# Patient Record
Sex: FEMALE | Race: WHITE | Employment: FULL TIME | URBAN - METROPOLITAN AREA
[De-identification: names, ages, dates, MRNs, and addresses within clinical notes are randomized per-mention and may not be internally consistent; named-entity substitution may affect disease eponyms.]

---

## 2018-01-10 ENCOUNTER — HOSPITAL ENCOUNTER (OUTPATIENT)
Dept: MAMMOGRAPHY | Age: 43
Discharge: HOME | End: 2018-01-10
Payer: COMMERCIAL

## 2018-01-10 DIAGNOSIS — Z12.31 SCREENING MAMMOGRAM, ENCOUNTER FOR: ICD-10-CM

## 2018-01-10 DIAGNOSIS — Z01.419 WELL WOMAN EXAM: ICD-10-CM

## 2018-01-10 DIAGNOSIS — Z80.3 FAMILY HISTORY OF BREAST CANCER: ICD-10-CM

## 2018-01-10 PROCEDURE — 77067 SCR MAMMO BI INCL CAD: CPT

## 2018-06-12 PROBLEM — Z80.3 FAMILY HISTORY OF BREAST CANCER: Chronic | Status: ACTIVE | Noted: 2018-06-12

## 2019-01-17 ENCOUNTER — HOSPITAL ENCOUNTER (OUTPATIENT)
Dept: MAMMOGRAPHY | Age: 44
Discharge: HOME | End: 2019-01-17
Attending: OBSTETRICS & GYNECOLOGY
Payer: COMMERCIAL

## 2019-01-17 DIAGNOSIS — R92.2 DENSE BREAST TISSUE ON MAMMOGRAM: ICD-10-CM

## 2019-01-17 DIAGNOSIS — Z01.419 WELL WOMAN EXAM: ICD-10-CM

## 2019-01-17 DIAGNOSIS — Z12.31 SCREENING MAMMOGRAM, ENCOUNTER FOR: ICD-10-CM

## 2019-01-17 PROCEDURE — 77063 BREAST TOMOSYNTHESIS BI: CPT

## 2019-01-22 NOTE — PROGRESS NOTES
Could have breast MRI. Will need to likely precert insurance. Lifetime incidence of breast cancer is 20%.

## 2020-01-29 ENCOUNTER — HOSPITAL ENCOUNTER (OUTPATIENT)
Dept: MAMMOGRAPHY | Age: 45
Discharge: HOME | End: 2020-01-29
Attending: NURSE PRACTITIONER
Payer: COMMERCIAL

## 2020-01-29 DIAGNOSIS — Z12.39 BREAST CANCER SCREENING: ICD-10-CM

## 2020-01-29 PROCEDURE — 77063 BREAST TOMOSYNTHESIS BI: CPT

## 2020-01-31 NOTE — PROGRESS NOTES
Mammogram benign. Due to pt's family history and dense breast tissue, can offer pt MRI. Have her check with insurance coverage if she is interested.

## 2020-02-05 ENCOUNTER — HOSPITAL ENCOUNTER (EMERGENCY)
Age: 45
Discharge: HOME | End: 2020-02-05
Attending: EMERGENCY MEDICINE
Payer: COMMERCIAL

## 2020-02-05 ENCOUNTER — APPOINTMENT (OUTPATIENT)
Dept: GENERAL RADIOLOGY | Age: 45
End: 2020-02-05
Attending: EMERGENCY MEDICINE
Payer: COMMERCIAL

## 2020-02-05 VITALS
HEART RATE: 82 BPM | WEIGHT: 170 LBS | RESPIRATION RATE: 16 BRPM | DIASTOLIC BLOOD PRESSURE: 76 MMHG | OXYGEN SATURATION: 98 % | BODY MASS INDEX: 29.02 KG/M2 | TEMPERATURE: 97.4 F | SYSTOLIC BLOOD PRESSURE: 140 MMHG | HEIGHT: 64 IN

## 2020-02-05 DIAGNOSIS — R07.89 ATYPICAL CHEST PAIN: Primary | ICD-10-CM

## 2020-02-05 LAB
ALBUMIN SERPL-MCNC: 3.8 G/DL (ref 3.5–5)
ALBUMIN/GLOB SERPL: 1 {RATIO} (ref 1.2–3.5)
ALP SERPL-CCNC: 90 U/L (ref 50–130)
ALT SERPL-CCNC: 31 U/L (ref 12–65)
ANION GAP SERPL CALC-SCNC: 5 MMOL/L (ref 7–16)
AST SERPL-CCNC: 16 U/L (ref 15–37)
ATRIAL RATE: 86 BPM
BASOPHILS # BLD: 0 K/UL (ref 0–0.2)
BASOPHILS NFR BLD: 1 % (ref 0–2)
BILIRUB SERPL-MCNC: 0.8 MG/DL (ref 0.2–1.1)
BUN SERPL-MCNC: 10 MG/DL (ref 6–23)
CALCIUM SERPL-MCNC: 8.7 MG/DL (ref 8.3–10.4)
CALCULATED P AXIS, ECG09: 70 DEGREES
CALCULATED R AXIS, ECG10: 74 DEGREES
CALCULATED T AXIS, ECG11: 65 DEGREES
CHLORIDE SERPL-SCNC: 104 MMOL/L (ref 98–107)
CO2 SERPL-SCNC: 29 MMOL/L (ref 21–32)
CREAT SERPL-MCNC: 0.91 MG/DL (ref 0.6–1)
DIAGNOSIS, 93000: NORMAL
DIFFERENTIAL METHOD BLD: NORMAL
EOSINOPHIL # BLD: 0.1 K/UL (ref 0–0.8)
EOSINOPHIL NFR BLD: 2 % (ref 0.5–7.8)
ERYTHROCYTE [DISTWIDTH] IN BLOOD BY AUTOMATED COUNT: 13.1 % (ref 11.9–14.6)
GLOBULIN SER CALC-MCNC: 4 G/DL (ref 2.3–3.5)
GLUCOSE SERPL-MCNC: 110 MG/DL (ref 65–100)
HCT VFR BLD AUTO: 40.8 % (ref 35.8–46.3)
HGB BLD-MCNC: 13.7 G/DL (ref 11.7–15.4)
IMM GRANULOCYTES # BLD AUTO: 0 K/UL (ref 0–0.5)
IMM GRANULOCYTES NFR BLD AUTO: 1 % (ref 0–5)
LYMPHOCYTES # BLD: 1.5 K/UL (ref 0.5–4.6)
LYMPHOCYTES NFR BLD: 26 % (ref 13–44)
MAGNESIUM SERPL-MCNC: 2 MG/DL (ref 1.8–2.4)
MCH RBC QN AUTO: 29.5 PG (ref 26.1–32.9)
MCHC RBC AUTO-ENTMCNC: 33.6 G/DL (ref 31.4–35)
MCV RBC AUTO: 87.7 FL (ref 79.6–97.8)
MONOCYTES # BLD: 0.6 K/UL (ref 0.1–1.3)
MONOCYTES NFR BLD: 11 % (ref 4–12)
NEUTS SEG # BLD: 3.5 K/UL (ref 1.7–8.2)
NEUTS SEG NFR BLD: 61 % (ref 43–78)
NRBC # BLD: 0 K/UL (ref 0–0.2)
P-R INTERVAL, ECG05: 124 MS
PLATELET # BLD AUTO: 216 K/UL (ref 150–450)
PMV BLD AUTO: 9.4 FL (ref 9.4–12.3)
POTASSIUM SERPL-SCNC: 3.9 MMOL/L (ref 3.5–5.1)
PROT SERPL-MCNC: 7.8 G/DL (ref 6.3–8.2)
Q-T INTERVAL, ECG07: 358 MS
QRS DURATION, ECG06: 86 MS
QTC CALCULATION (BEZET), ECG08: 428 MS
RBC # BLD AUTO: 4.65 M/UL (ref 4.05–5.2)
SODIUM SERPL-SCNC: 138 MMOL/L (ref 136–145)
TROPONIN I BLD-MCNC: 0 NG/ML (ref 0.02–0.05)
VENTRICULAR RATE, ECG03: 86 BPM
WBC # BLD AUTO: 5.7 K/UL (ref 4.3–11.1)

## 2020-02-05 PROCEDURE — 74011250637 HC RX REV CODE- 250/637: Performed by: EMERGENCY MEDICINE

## 2020-02-05 PROCEDURE — 84484 ASSAY OF TROPONIN QUANT: CPT

## 2020-02-05 PROCEDURE — 83735 ASSAY OF MAGNESIUM: CPT

## 2020-02-05 PROCEDURE — 93005 ELECTROCARDIOGRAM TRACING: CPT | Performed by: EMERGENCY MEDICINE

## 2020-02-05 PROCEDURE — 99285 EMERGENCY DEPT VISIT HI MDM: CPT

## 2020-02-05 PROCEDURE — 74011250636 HC RX REV CODE- 250/636: Performed by: EMERGENCY MEDICINE

## 2020-02-05 PROCEDURE — 96374 THER/PROPH/DIAG INJ IV PUSH: CPT

## 2020-02-05 PROCEDURE — 80053 COMPREHEN METABOLIC PANEL: CPT

## 2020-02-05 PROCEDURE — 85025 COMPLETE CBC W/AUTO DIFF WBC: CPT

## 2020-02-05 PROCEDURE — 71046 X-RAY EXAM CHEST 2 VIEWS: CPT

## 2020-02-05 RX ORDER — NAPROXEN 500 MG/1
500 TABLET ORAL 2 TIMES DAILY WITH MEALS
Qty: 20 TAB | Refills: 0 | Status: SHIPPED | OUTPATIENT
Start: 2020-02-05 | End: 2020-02-15

## 2020-02-05 RX ORDER — SODIUM CHLORIDE 0.9 % (FLUSH) 0.9 %
5-40 SYRINGE (ML) INJECTION EVERY 8 HOURS
Status: DISCONTINUED | OUTPATIENT
Start: 2020-02-05 | End: 2020-02-05 | Stop reason: HOSPADM

## 2020-02-05 RX ORDER — GUAIFENESIN 100 MG/5ML
324 LIQUID (ML) ORAL
Status: COMPLETED | OUTPATIENT
Start: 2020-02-05 | End: 2020-02-05

## 2020-02-05 RX ORDER — KETOROLAC TROMETHAMINE 30 MG/ML
30 INJECTION, SOLUTION INTRAMUSCULAR; INTRAVENOUS ONCE
Status: COMPLETED | OUTPATIENT
Start: 2020-02-05 | End: 2020-02-05

## 2020-02-05 RX ORDER — SODIUM CHLORIDE 0.9 % (FLUSH) 0.9 %
5-40 SYRINGE (ML) INJECTION AS NEEDED
Status: DISCONTINUED | OUTPATIENT
Start: 2020-02-05 | End: 2020-02-05 | Stop reason: HOSPADM

## 2020-02-05 RX ADMIN — ASPIRIN 81 MG 324 MG: 81 TABLET ORAL at 10:20

## 2020-02-05 RX ADMIN — KETOROLAC TROMETHAMINE 30 MG: 30 INJECTION, SOLUTION INTRAMUSCULAR at 11:12

## 2020-02-05 NOTE — ED NOTES
I have reviewed discharge instructions with the patient. The patient verbalized understanding. Patient left ED via Discharge Method: ambulatory to Home with self. Opportunity for questions and clarification provided. Patient given 1 scripts. To continue your aftercare when you leave the hospital, you may receive an automated call from our care team to check in on how you are doing. This is a free service and part of our promise to provide the best care and service to meet your aftercare needs.  If you have questions, or wish to unsubscribe from this service please call [phone]. Thank you for Choosing our [address] [place] [address] Emergency Department.

## 2020-02-05 NOTE — ED PROVIDER NOTES
Patient is a 53-year-old female comes to the emergency department this morning complaining of chest pain. She states she has been having this pain for a \"while\" she reports that is several weeks to months. She gets daily episodes of pain in the left chest.  Radiates to the left shoulder with associated shortness of breath. She describes it it as a pressure and heaviness. The pain became more severe last evening and persisted all night long prompting her to seek evaluation today. The history is provided by the patient. Chest Pain    This is a new problem. The current episode started more than 1 week ago. The problem has been gradually worsening. The problem occurs daily. The pain is associated with normal activity. The pain is present in the left side. The quality of the pain is described as pressure-like. The pain radiates to the left shoulder. The symptoms are aggravated by exertion and certain positions. Associated symptoms include shortness of breath. Pertinent negatives include no abdominal pain, no back pain, no cough, no fever, no hemoptysis, no irregular heartbeat, no leg pain, no nausea, no palpitations, no vomiting and no weakness. She has tried nothing for the symptoms. Risk factors include family history. Her past medical history does not include DM or HTN. Pertinent negatives include no cardiac catheterization, no stress thallium and no cardiac stents. Past Medical History:   Diagnosis Date    Asthma     Benign mole 01/2019    Pre-Cancerous x 2 on Abdomen    Family history of breast cancer 06/12/2018    Pt saw [facility]. Had genetic testing and this was pending. Her lifetime risk based on family hx was 20% for breast cancer. Candidate for annual breast mammogram and MRI.       GDM (gestational diabetes mellitus)     with 1st pregnancy    Miscarriage 2015    Vitamin D deficiency        Past Surgical History:   Procedure Laterality Date    HX HYSTEROSCOPY, STERILIZE WITH TUBAL IMPLANT      Essure coil    HX LAP CHOLECYSTECTOMY  12/9/2009    HX WISDOM TEETH EXTRACTION  1995         Family History:   Problem Relation Age of Onset    Arrhythmia Sister     Heart Attack Sister     Hypertension Mother     Thyroid Disease Mother         Hypothyroidism    Breast Cancer Mother 39    Ovarian Cancer Maternal Grandmother     Breast Cancer Maternal Grandmother     Cancer Maternal Grandmother         Throat    Breast Cancer Paternal Grandmother     Ovarian Cancer Paternal Grandmother     Migraines Sister     Infertility Sister     Hypertension Father     Diabetes Father     Breast Cancer Other         Maternal Great Aunts x 4    Colon Cancer Other         Paternal 1st cousin    No Known Problems Brother     Alcohol abuse Maternal Grandfather     Other Paternal Grandfather         Smoker    Alcohol abuse Paternal Grandfather     Allergy-severe Sister         Wheat    Cancer Sister         Earma Ovens    Glaucoma Maternal Aunt         Maternal Great Aunt    Breast Cancer Maternal Aunt     Infertility Paternal Aunt     Breast Cancer Paternal Aunt        Social History     Socioeconomic History    Marital status:      Spouse name: Not on file    Number of children: Not on file    Years of education: Not on file    Highest education level: Not on file   Occupational History    Not on file   Social Needs    Financial resource strain: Not on file    Food insecurity:     Worry: Not on file     Inability: Not on file    Transportation needs:     Medical: Not on file     Non-medical: Not on file   Tobacco Use    Smoking status: Never Smoker    Smokeless tobacco: Never Used   Substance and Sexual Activity    Alcohol use: No    Drug use: No    Sexual activity: Yes     Partners: Male     Birth control/protection: Surgical     Comment: ESSURE   Lifestyle    Physical activity:     Days per week: Not on file     Minutes per session: Not on file    Stress: Not on file   Relationships    Social connections:     Talks on phone: Not on file     Gets together: Not on file     Attends [employer] service: Not on file     Active member of club or organization: Not on file     Attends meetings of clubs or organizations: Not on file     Relationship status: Not on file    Intimate partner violence:     Fear of current or ex partner: Not on file     Emotionally abused: Not on file     Physically abused: Not on file     Forced sexual activity: Not on file   Other Topics Concern    Not on file   Social History Narrative    Not on file         ALLERGIES: Peanut    Review of Systems   Constitutional: Negative for chills, fatigue and fever. HENT: Negative for congestion, rhinorrhea and sore throat. Eyes: Negative for pain, discharge and visual disturbance. Respiratory: Positive for shortness of breath. Negative for cough and hemoptysis. Cardiovascular: Positive for chest pain. Negative for palpitations. Gastrointestinal: Negative for abdominal pain, diarrhea, nausea and vomiting. Endocrine: Negative for polydipsia and polyuria. Genitourinary: Negative for dysuria, frequency and urgency. Musculoskeletal: Negative for back pain and neck pain. Skin: Negative for rash. Neurological: Negative for seizures, syncope and weakness. Hematological: Negative. Vitals:    02/05/20 1011   BP: 131/71   Pulse: 87   Resp: 16   Temp: 97.9 °F (36.6 °C)   SpO2: 100%   Weight: 77.1 kg (170 lb)   Height: 5' 4\" (1.626 m)            Physical Exam  Vitals signs and nursing note reviewed. Constitutional:       Appearance: She is well-developed. HENT:      Head: Normocephalic and atraumatic. Eyes:      Conjunctiva/sclera: Conjunctivae normal.      Pupils: Pupils are equal, round, and reactive to light. Neck:      Musculoskeletal: Normal range of motion and neck supple. Cardiovascular:      Rate and Rhythm: Normal rate and regular rhythm.       Pulses:           Carotid pulses are 2+ on the right side and 2+ on the left side. Radial pulses are 2+ on the right side and 2+ on the left side. Dorsalis pedis pulses are 2+ on the right side and 2+ on the left side. Posterior tibial pulses are 2+ on the right side and 2+ on the left side. Heart sounds: Normal heart sounds. Pulmonary:      Effort: Pulmonary effort is normal.      Breath sounds: Normal breath sounds. Chest:      Chest wall: Tenderness present. Comments: Palpation of the left anterior chest wall reproduces her pain. Abdominal:      Palpations: Abdomen is soft. Tenderness: There is no abdominal tenderness. There is no guarding or rebound. Musculoskeletal: Normal range of motion. General: No deformity. Right lower leg: No edema. Left lower leg: No edema. Lymphadenopathy:      Cervical: No cervical adenopathy. Skin:     General: Skin is warm and dry. Capillary Refill: Capillary refill takes less than 2 seconds. Findings: No rash. Neurological:      Mental Status: She is alert and oriented to person, place, and time. GCS: GCS eye subscore is 4. GCS verbal subscore is 5. GCS motor subscore is 6. Cranial Nerves: No cranial nerve deficit. Sensory: No sensory deficit. MDM  Number of Diagnoses or Management Options  Diagnosis management comments: EKG reviewed by me shows normal sinus rhythm rate of 86, no acute ischemia    11:17 AM  X-ray normal  Troponin 0  Basic labs unremarkable    With a nonischemic EKG several days of pain which has been worse for greater than 12 hours I think this is all musculoskeletal pain I see no sign of acute coronary syndrome. I will refer her to [address] [place] [address] [address] [facility] for an outpatient evaluation but feel she can be discharged home today. Voice dictation software was used during the making of this note. This software is not perfect and grammatical and other typographical errors may be present.   This note has been proofread, but may still contain errors.   Elver Snider MD; 2/5/2020 @11:17 AM   ===================================================================         Amount and/or Complexity of Data Reviewed  Clinical lab tests: ordered and reviewed  Tests in the radiology section of CPT®: ordered  Tests in the medicine section of CPT®: reviewed and ordered  Review and summarize past medical records: yes  Independent visualization of images, tracings, or specimens: yes    Risk of Complications, Morbidity, and/or Mortality  Presenting problems: moderate  Diagnostic procedures: low  Management options: low    Patient Progress  Patient progress: stable         Procedures

## 2020-02-05 NOTE — DISCHARGE INSTRUCTIONS
Use the pain medication as prescribed. Follow-up with your doctor and with cardiology as referred. Return to the ER for any other acute concerns.

## 2020-03-18 PROBLEM — R07.89 CHEST DISCOMFORT: Status: ACTIVE | Noted: 2020-03-18

## 2021-01-04 ENCOUNTER — TRANSCRIBE ORDER (OUTPATIENT)
Dept: SCHEDULING | Age: 46
End: 2021-01-04

## 2021-01-04 DIAGNOSIS — Z12.31 SCREENING MAMMOGRAM FOR HIGH-RISK PATIENT: Primary | ICD-10-CM

## 2021-02-24 ENCOUNTER — HOSPITAL ENCOUNTER (OUTPATIENT)
Dept: MAMMOGRAPHY | Age: 46
Discharge: HOME | End: 2021-02-24
Attending: OBSTETRICS & GYNECOLOGY
Payer: COMMERCIAL

## 2021-02-24 DIAGNOSIS — Z12.31 SCREENING MAMMOGRAM FOR HIGH-RISK PATIENT: ICD-10-CM

## 2021-02-24 PROCEDURE — 77067 SCR MAMMO BI INCL CAD: CPT

## 2021-03-04 ENCOUNTER — HOSPITAL ENCOUNTER (OUTPATIENT)
Dept: MAMMOGRAPHY | Age: 46
Discharge: HOME | End: 2021-03-04
Attending: OBSTETRICS & GYNECOLOGY
Payer: COMMERCIAL

## 2021-03-04 DIAGNOSIS — R92.8 ABNORMAL SCREENING MAMMOGRAM: ICD-10-CM

## 2021-03-04 PROCEDURE — 76642 ULTRASOUND BREAST LIMITED: CPT

## 2021-03-08 NOTE — PROGRESS NOTES
Saw that your additonal imaging is negative. However MRI was recommended with dense breast tissue and lifetime breast cancer risk of 25%. Do you want us to order that?

## 2021-09-21 PROBLEM — E06.3 HASHIMOTO'S THYROIDITIS: Status: ACTIVE | Noted: 2021-09-21

## 2022-03-18 PROBLEM — E06.3 HASHIMOTO'S THYROIDITIS: Status: ACTIVE | Noted: 2021-09-21

## 2022-03-19 PROBLEM — Z80.3 FAMILY HISTORY OF BREAST CANCER: Status: ACTIVE | Noted: 2018-06-12

## 2022-03-19 PROBLEM — R07.89 CHEST DISCOMFORT: Status: ACTIVE | Noted: 2020-03-18

## 2022-04-15 VITALS — BODY MASS INDEX: 31.92 KG/M2 | HEIGHT: 64 IN | WEIGHT: 187 LBS

## 2022-04-15 NOTE — PERIOP NOTES
Patient verified name and . Order for consent not found in EHR. Type 1B surgery, PAT phone assessment complete. Orders not received. Labs per surgeon: no orders received. Labs per anesthesia protocol: none    Patient answered medical/surgical history questions at their best of ability. All prior to admission medications documented in [facility]. Patient instructed to take the following medications the day of surgery according to anesthesia guidelines with a small sip of water: none. On the day before surgery please take Acetaminophen 1000mg in the morning and then again before bed. You may substitute for Tylenol 650 mg. Hold all vitamins 7 days prior to surgery and NSAIDS 5 days prior to surgery. Patient instructed on the following:    > Arrive at A Entrance, time of arrival to be called the day before by 1700  > NPO after midnight including gum, mints, and ice chips  > Responsible adult must drive patient to the hospital, stay during surgery, and patient will need supervision 24 hours after anesthesia  > Use antibacterial soap in shower the night before surgery and on the morning of surgery  > All piercings must be removed prior to arrival.    > Leave all valuables (money and jewelry) at home but bring insurance card and ID on DOS.   > You may be required to pay a deductible or co-pay on the day of your procedure. You can pre-pay by calling [phone] if your surgery is at the [facility] or [phone] if your surgery is at the [facility]. > Do not wear make-up, nail polish, lotions, cologne, perfumes, powders, or oil on skin. Artificial nails are not permitted.

## 2022-04-21 ENCOUNTER — HOSPITAL ENCOUNTER (OUTPATIENT)
Dept: SURGERY | Age: 47
Discharge: HOME | End: 2022-04-21

## 2022-04-25 PROBLEM — M19.012 DEGENERATIVE JOINT DISEASE OF LEFT ACROMIOCLAVICULAR JOINT: Status: ACTIVE | Noted: 2022-04-25

## 2022-04-25 PROBLEM — M75.112 INCOMPLETE TEAR OF LEFT ROTATOR CUFF: Status: ACTIVE | Noted: 2022-04-25

## 2022-04-25 PROBLEM — M75.22 BICIPITAL TENDINITIS OF LEFT SHOULDER: Status: ACTIVE | Noted: 2022-04-25

## 2022-04-25 NOTE — H&P
Subjective:     Patient is a 42 y.o. AMBIDEXTROUS FEMALE WITH LEFT SHOULDER PAIN. SEE OFFICE NOTE. Patient Active Problem List    Diagnosis Date Noted    Incomplete tear of left rotator cuff 04/25/2022    Bicipital tendinitis of left shoulder 04/25/2022    Degenerative joint disease of left acromioclavicular joint 04/25/2022    Hashimoto's thyroiditis 09/21/2021    Chest discomfort 03/18/2020    Family history of breast cancer 06/12/2018    Thyroid nodule 10/04/2016    Vitamin D deficiency 10/04/2016     Past Medical History:   Diagnosis Date    Anxiety     History of     Asthma     Has been numerous years since last use of inhalers     Benign mole 01/2019    Pre-Cancerous x 2 on Abdomen    Family history of breast cancer 06/12/2018    Pt saw [facility]. Had genetic testing and this was pending. Her lifetime risk based on family hx was 20% for breast cancer. Candidate for annual breast mammogram and MRI.  GDM (gestational diabetes mellitus)     with 1st pregnancy    Hashimoto's disease     Hypertension     History of, no meds     Miscarriage 2015    Vitamin D deficiency       Past Surgical History:   Procedure Laterality Date    HX HYSTEROSCOPY, STERILIZE WITH TUBAL IMPLANT      Essure coil    HX LAP CHOLECYSTECTOMY  12/9/2009    HX ORTHOPAEDIC      Right foot and toes surgery    HX WISDOM TEETH EXTRACTION  1995      Prior to Admission medications    Medication Sig Start Date End Date Taking? Authorizing Provider   ibuprofen (MOTRIN) 800 mg tablet Take 800 mg by mouth every eight (8) hours as needed. 7/25/21   Provider, Historical   calcium carb-vitamin D3-vit K2 600 mg-1,000 unit-90 mcg tab Take 600 mg by mouth two (2) times a day. Provider, Historical   hydroCHLOROthiazide (HYDRODIURIL) 25 mg tablet Take 25 mg by mouth daily. Provider, Historical   FLUoxetine (PROzac) 10 mg tablet Take 10 mg by mouth daily.   Patient not taking: Reported on 3/28/2022 Provider, Historical   nitroglycerin (Nitrostat) 0.4 mg SL tablet 1 Tab by SubLINGual route every five (5) minutes as needed for Chest Pain. Up to 3 doses, THEN PROCEED TO ER. Patient not taking: Reported on 9/21/2021 3/23/20   Torsten Campos MD   diphenhydrAMINE (BENADRYL ALLERGY) 25 mg tablet Take 75 mg by mouth daily. Provider, Historical   omeprazole (PRILOSEC) 40 mg capsule Take 1 Cap by mouth daily. 2/13/20   Torsten Campos MD   albuterol (PROVENTIL HFA, VENTOLIN HFA, PROAIR HFA) 90 mcg/actuation inhaler INHALE 2 PUFFS BY MOUTH EVERY 6 HOURS AS NEEDED  Patient not taking: Reported on 3/28/2022 8/29/19   Provider, Historical   Cholecalciferol, Vitamin D3, (VITAMIN D3) 2,000 unit cap capsule Take 1,200 Units by mouth daily.     Provider, Historical     Allergies   Allergen Reactions    Peanut Other (comments)     NOT ALLERGIC      Social History     Tobacco Use    Smoking status: Never Smoker    Smokeless tobacco: Never Used   Substance Use Topics    Alcohol use: No      Family History   Problem Relation Age of Onset    Arrhythmia Sister     Congenital heart defect Sister     Hypertension Mother     Thyroid Disease Mother         Hypothyroidism    Breast Cancer Mother 39    Ovarian Cancer Maternal Grandmother     Breast Cancer Maternal Grandmother     Cancer Maternal Grandmother         Throat    Breast Cancer Paternal Grandmother     Ovarian Cancer Paternal Grandmother     Migraines Sister     Infertility Sister     Hypertension Father     Diabetes Father     Breast Cancer Other         Maternal Great Aunts x 4    Colon Cancer Other         Paternal 1st cousin    No Known Problems Brother     Alcohol abuse Maternal Grandfather     Other Paternal Grandfather         Smoker    Alcohol abuse Paternal Grandfather     Allergy-severe Sister         Wheat    Cancer Sister         Bette Jose    Glaucoma Maternal Aunt         Maternal Great Aunt    Breast Cancer Maternal Aunt     Infertility Paternal Aunt     Breast Cancer Paternal Aunt       Review of Systems  A comprehensive review of systems was negative except for that written in the HPI. Objective:     No data found. There were no vitals taken for this visit. General:  Alert, cooperative, no distress, appears stated age. Head:  Normocephalic, without obvious abnormality, atraumatic. Back:   Symmetric, no curvature. ROM normal. No CVA tenderness. Lungs:   Clear to auscultation bilaterally. Chest wall:  No tenderness or deformity. Heart:  Regular rate and rhythm, S1, S2 normal, no murmur, click, rub or gallop. Extremities: Extremities normal, atraumatic, no cyanosis or edema. Pulses: 2+ and symmetric all extremities. Skin: Skin color, texture, turgor normal. No rashes or lesions. Lymph nodes: Cervical, supraclavicular, and axillary nodes normal.   Neurologic: CNII-XII intact. Normal strength, sensation and reflexes throughout. Assessment:     Principal Problem:    Incomplete tear of left rotator cuff (4/25/2022)    Active Problems:    Bicipital tendinitis of left shoulder (4/25/2022)      Degenerative joint disease of left acromioclavicular joint (4/25/2022)        Plan:     The various methods of treatment have been discussed with the patient and family. PATIENT HAS EXHAUSTED NON-OPERATIVE MODALITIES     After consideration of risks, benefits and other options for treatment, the patient has consented to surgical intervention.     SEE OFFICE NOTE    Rock Olivia MD

## 2022-04-25 NOTE — H&P
Name: Mishel Servin  YOB: 1975  Gender: female  MRN: [id number]            HPI: Mishel Servin is a 42 y.o. ambidextrous female seen for left shoulder problems. She returns noting that the left shoulder is is affecting her quality of life. It is no better. It severely painful. She cannot utilize the left arm. Not sleeping at night. ROS/Meds/PSH/PMH/FH/SH: A ten system review of systems was performed and is negative other than what is in the HPI. Tobacco:  reports that she has never smoked. She has never used smokeless tobacco.  There were no vitals taken for this visit. Physical Examination:  She is an awake alert pleasant female ambulating without difficulty she is wearing a walking boot on the right foot  Full range of cervical spine motion without radicular findings    The right shoulder has 0 to 180 degrees of active and 0 to 180 degrees passive forward elevation. Internal rotation is to T6. External rotation is to 60 degrees at the side. In the 90 degree abducted position 90 degrees of external and 90 degrees internal rotation  The AC joint is non-tender  SC joint is non-tender. Greater tuberosity is non-tender. negative biceps  Negative O'Briens sign  negative lift-off sign  Negative belly press sign  Negative bear huggers sign  negative drop sign  negative hornblower's sign  No posterior glenohumeral joint line tenderness. No evident excessive external rotation  Rotator cuff strength is 5/5.  negative external rotation stress test.   Negative empty can sign  There is no evident anterior or posterior apprehension with a negative sulcus sign. No instability  negative external and internal Rotation lag sign  Neurovascularly intact. The left shoulder has 0 to 130 degrees of active and 0 to 150 degrees passive forward elevation.    Marked pain in the overhead position  Positive Neer and Christine impingement sign  Internal rotation is to T6.  External rotation is to 60 degrees at the side. In the 90 degree abducted position 90 degrees of external and 90 degrees internal rotation  The AC joint is tender  SC joint is non-tender. Greater tuberosity is tender. Positive bicipital stress test negative Ten sign  Negative O'Briens sign  negative lift-off sign  Negative belly press sign  Negative bear huggers sign  negative drop sign  negative hornblower's sign  No posterior glenohumeral joint line tenderness. No evident excessive external rotation  Rotator cuff strength is 5-/5 with marked weakness  negative external rotation stress test.   Negative empty can sign  There is no evident anterior or posterior apprehension with a negative sulcus sign. No instability  negative external and internal Rotation lag sign  Neurovascularly intact. Data Reviewed:      MRI left shoulder demonstrates a type II acromion. Degenerative changes in the [facility] joint. Increasing was seen the rotator cuff consistent with a high-grade partial-thickness rotator cuff tear no definitive full-thickness cuff tear no atrophy there is evidence of biceps tendinopathy. Increased signal seen in the superior labrum suspicious for SLAP tear not definitive. Glenohumeral articular cartilage is intact. No atrophy. Impression:   1. Nontraumatic incomplete tear of left rotator cuff    2. Bicipital tendinitis of left shoulder    3. Degenerative joint disease of left acromioclavicular joint       Rule out SLAP tear left shoulder   Hashimoto's thyroiditis   Status post right foot surgery    Plan:   I discussed the problem with the patient. I discussed nonoperative versus operative intervention including injections. Her left shoulder is affecting her quality of life and has demonstrated no improvement. In my opinion she has exhausted nonoperative modalities.   She would be a candidate for arthroscopy left shoulder ASD without acromioplasty,  ADCR, extensive debridement SLAP tear, biceps labral complex, glenohumeral joint, subacromial space, mini open rotator cuff repair and biceps tenodesis. This will be performed utilizing general anesthesia with an interscalene block on outpatient basis. I would measure hemoglobin A1c and a fasting blood glucose. I discussed the risks and benefits of the procedure with her and expected outcomes. We will proceed at her convenience      4.   Chronic illness with exacerbation    M75.112  M75.22  M19.012      Follow up:             Eusebia Conner MD

## 2022-04-27 ENCOUNTER — ANESTHESIA EVENT (OUTPATIENT)
Dept: SURGERY | Age: 47
End: 2022-04-27
Payer: COMMERCIAL

## 2022-04-27 RX ORDER — SODIUM CHLORIDE 0.9 % (FLUSH) 0.9 %
5-40 SYRINGE (ML) INJECTION AS NEEDED
Status: CANCELLED | OUTPATIENT
Start: 2022-04-27

## 2022-04-27 RX ORDER — SODIUM CHLORIDE 0.9 % (FLUSH) 0.9 %
5-40 SYRINGE (ML) INJECTION EVERY 8 HOURS
Status: CANCELLED | OUTPATIENT
Start: 2022-04-27

## 2022-04-28 ENCOUNTER — ANESTHESIA (OUTPATIENT)
Dept: SURGERY | Age: 47
End: 2022-04-28
Payer: COMMERCIAL

## 2022-04-28 ENCOUNTER — HOSPITAL ENCOUNTER (OUTPATIENT)
Age: 47
Setting detail: OUTPATIENT SURGERY
Discharge: HOME | End: 2022-04-28
Attending: ORTHOPAEDIC SURGERY | Admitting: ORTHOPAEDIC SURGERY
Payer: COMMERCIAL

## 2022-04-28 ENCOUNTER — APPOINTMENT (OUTPATIENT)
Dept: GENERAL RADIOLOGY | Age: 47
End: 2022-04-28
Attending: ORTHOPAEDIC SURGERY
Payer: COMMERCIAL

## 2022-04-28 VITALS
BODY MASS INDEX: 31.92 KG/M2 | RESPIRATION RATE: 16 BRPM | DIASTOLIC BLOOD PRESSURE: 82 MMHG | SYSTOLIC BLOOD PRESSURE: 142 MMHG | HEIGHT: 64 IN | OXYGEN SATURATION: 91 % | WEIGHT: 187 LBS | HEART RATE: 69 BPM | TEMPERATURE: 97.8 F

## 2022-04-28 DIAGNOSIS — M19.012 DEGENERATIVE JOINT DISEASE OF LEFT ACROMIOCLAVICULAR JOINT: Primary | ICD-10-CM

## 2022-04-28 DIAGNOSIS — M75.22 BICIPITAL TENDINITIS OF LEFT SHOULDER: ICD-10-CM

## 2022-04-28 DIAGNOSIS — S43.432D SUPERIOR GLENOID LABRUM LESION OF LEFT SHOULDER, SUBSEQUENT ENCOUNTER: ICD-10-CM

## 2022-04-28 DIAGNOSIS — M75.112 NONTRAUMATIC INCOMPLETE TEAR OF LEFT ROTATOR CUFF: ICD-10-CM

## 2022-04-28 PROBLEM — S43.432A SUPERIOR GLENOID LABRUM LESION OF LEFT SHOULDER: Status: ACTIVE | Noted: 2022-04-28

## 2022-04-28 LAB
EST. AVERAGE GLUCOSE BLD GHB EST-MCNC: NORMAL MG/DL
GLUCOSE BLD STRIP.AUTO-MCNC: 93 MG/DL (ref 65–100)
HBA1C MFR BLD: 4.8 % (ref 4.2–6.3)
HCG UR QL: NEGATIVE
SERVICE CMNT-IMP: NORMAL

## 2022-04-28 PROCEDURE — 76210000006 HC OR PH I REC 0.5 TO 1 HR: Performed by: ORTHOPAEDIC SURGERY

## 2022-04-28 PROCEDURE — 77030039425 HC BLD LARYNG TRULITE DISP TELE -A: Performed by: REGISTERED NURSE

## 2022-04-28 PROCEDURE — 74011250636 HC RX REV CODE- 250/636: Performed by: ANESTHESIOLOGY

## 2022-04-28 PROCEDURE — 23412 REPAIR ROTATOR CUFF CHRONIC: CPT | Performed by: ORTHOPAEDIC SURGERY

## 2022-04-28 PROCEDURE — 76210000020 HC REC RM PH II FIRST 0.5 HR: Performed by: ORTHOPAEDIC SURGERY

## 2022-04-28 PROCEDURE — 74011000250 HC RX REV CODE- 250: Performed by: ANESTHESIOLOGY

## 2022-04-28 PROCEDURE — A4565 SLINGS: HCPCS | Performed by: ORTHOPAEDIC SURGERY

## 2022-04-28 PROCEDURE — 83036 HEMOGLOBIN GLYCOSYLATED A1C: CPT

## 2022-04-28 PROCEDURE — C1713 ANCHOR/SCREW BN/BN,TIS/BN: HCPCS | Performed by: ORTHOPAEDIC SURGERY

## 2022-04-28 PROCEDURE — 74011000250 HC RX REV CODE- 250: Performed by: REGISTERED NURSE

## 2022-04-28 PROCEDURE — 77030040922 HC BLNKT HYPOTHRM STRY -A: Performed by: ANESTHESIOLOGY

## 2022-04-28 PROCEDURE — 2709999900 HC NON-CHARGEABLE SUPPLY: Performed by: ORTHOPAEDIC SURGERY

## 2022-04-28 PROCEDURE — 74011250636 HC RX REV CODE- 250/636

## 2022-04-28 PROCEDURE — 77030003602 HC NDL NRV BLK BBMI -B: Performed by: ANESTHESIOLOGY

## 2022-04-28 PROCEDURE — 81025 URINE PREGNANCY TEST: CPT

## 2022-04-28 PROCEDURE — 76942 ECHO GUIDE FOR BIOPSY: CPT | Performed by: ORTHOPAEDIC SURGERY

## 2022-04-28 PROCEDURE — 74011000250 HC RX REV CODE- 250: Performed by: ORTHOPAEDIC SURGERY

## 2022-04-28 PROCEDURE — 29824 SHO ARTHRS SRG DSTL CLAVICLC: CPT | Performed by: ORTHOPAEDIC SURGERY

## 2022-04-28 PROCEDURE — 77030002916 HC SUT ETHLN J&J -A: Performed by: ORTHOPAEDIC SURGERY

## 2022-04-28 PROCEDURE — 73030 X-RAY EXAM OF SHOULDER: CPT

## 2022-04-28 PROCEDURE — 77030037088 HC TUBE ENDOTRACH ORAL NSL COVD-A: Performed by: REGISTERED NURSE

## 2022-04-28 PROCEDURE — 77030003666 HC NDL SPINAL BD -A: Performed by: ORTHOPAEDIC SURGERY

## 2022-04-28 PROCEDURE — 74011250636 HC RX REV CODE- 250/636: Performed by: REGISTERED NURSE

## 2022-04-28 PROCEDURE — 76060000034 HC ANESTHESIA 1.5 TO 2 HR: Performed by: ORTHOPAEDIC SURGERY

## 2022-04-28 PROCEDURE — 29823 SHO ARTHRS SRG XTNSV DBRDMT: CPT | Performed by: ORTHOPAEDIC SURGERY

## 2022-04-28 PROCEDURE — 77030031139 HC SUT VCRL2 J&J -A: Performed by: ORTHOPAEDIC SURGERY

## 2022-04-28 PROCEDURE — 76010010054 HC POST OP PAIN BLOCK: Performed by: ORTHOPAEDIC SURGERY

## 2022-04-28 PROCEDURE — 76010000161 HC OR TIME 1 TO 1.5 HR INTENSV-TIER 1: Performed by: ORTHOPAEDIC SURGERY

## 2022-04-28 PROCEDURE — 77030002986 HC SUT PROL J&J -A: Performed by: ORTHOPAEDIC SURGERY

## 2022-04-28 PROCEDURE — 77030027384 HC PRB ARTHSCP SERFAS STRY -C: Performed by: ORTHOPAEDIC SURGERY

## 2022-04-28 PROCEDURE — 23430 REPAIR BICEPS TENDON: CPT | Performed by: ORTHOPAEDIC SURGERY

## 2022-04-28 PROCEDURE — 82962 GLUCOSE BLOOD TEST: CPT

## 2022-04-28 DEVICE — ICONIX 2 NEEDLES WITH INTELLIBRAID TECHNOLOGY, 2.3MM ANCHOR WITH 2 STRANDS #2 FORCE FIBER
Type: IMPLANTABLE DEVICE | Site: SHOULDER | Status: FUNCTIONAL
Brand: ICONIX

## 2022-04-28 DEVICE — OMEGA 4.75MM PEEK KNOTLESS ANCHOR SYSTEM, SINGLE
Type: IMPLANTABLE DEVICE | Site: SHOULDER | Status: FUNCTIONAL
Brand: OMEGA

## 2022-04-28 DEVICE — 5.5MM PEEK ZIP SUTURE ANCHOR WITH ¿ CIRCLE TAPER NEEDLES, #2 FORCE FIBER
Type: IMPLANTABLE DEVICE | Site: SHOULDER | Status: FUNCTIONAL
Brand: PEEK ZIP

## 2022-04-28 RX ORDER — GABAPENTIN 100 MG/1
100 CAPSULE ORAL 3 TIMES DAILY
Qty: 90 CAPSULE | Refills: 0 | Status: SHIPPED | OUTPATIENT
Start: 2022-04-28 | End: 2022-05-28

## 2022-04-28 RX ORDER — GLYCOPYRROLATE 0.2 MG/ML
INJECTION INTRAMUSCULAR; INTRAVENOUS AS NEEDED
Status: DISCONTINUED | OUTPATIENT
Start: 2022-04-28 | End: 2022-04-28 | Stop reason: HOSPADM

## 2022-04-28 RX ORDER — KETOROLAC TROMETHAMINE 10 MG/1
10 TABLET, FILM COATED ORAL
Qty: 20 TABLET | Refills: 0 | Status: SHIPPED | OUTPATIENT
Start: 2022-04-28 | End: 2022-05-03

## 2022-04-28 RX ORDER — CEFAZOLIN SODIUM/WATER 2 G/20 ML
2 SYRINGE (ML) INTRAVENOUS ONCE
Status: COMPLETED | OUTPATIENT
Start: 2022-04-28 | End: 2022-04-28

## 2022-04-28 RX ORDER — LIDOCAINE HYDROCHLORIDE AND EPINEPHRINE 10; 10 MG/ML; UG/ML
INJECTION, SOLUTION INFILTRATION; PERINEURAL AS NEEDED
Status: DISCONTINUED | OUTPATIENT
Start: 2022-04-28 | End: 2022-04-28 | Stop reason: HOSPADM

## 2022-04-28 RX ORDER — EPINEPHRINE 1 MG/ML
INJECTION, SOLUTION, CONCENTRATE INTRAVENOUS
Status: COMPLETED | OUTPATIENT
Start: 2022-04-28 | End: 2022-04-28

## 2022-04-28 RX ORDER — SODIUM CHLORIDE, SODIUM LACTATE, POTASSIUM CHLORIDE, CALCIUM CHLORIDE 600; 310; 30; 20 MG/100ML; MG/100ML; MG/100ML; MG/100ML
100 INJECTION, SOLUTION INTRAVENOUS CONTINUOUS
Status: DISCONTINUED | OUTPATIENT
Start: 2022-04-28 | End: 2022-04-28 | Stop reason: HOSPADM

## 2022-04-28 RX ORDER — PROCHLORPERAZINE EDISYLATE 5 MG/ML
2.5 INJECTION INTRAMUSCULAR; INTRAVENOUS
Status: DISCONTINUED | OUTPATIENT
Start: 2022-04-28 | End: 2022-04-28 | Stop reason: HOSPADM

## 2022-04-28 RX ORDER — PROPOFOL 10 MG/ML
INJECTION, EMULSION INTRAVENOUS AS NEEDED
Status: DISCONTINUED | OUTPATIENT
Start: 2022-04-28 | End: 2022-04-28 | Stop reason: HOSPADM

## 2022-04-28 RX ORDER — HYDROMORPHONE HYDROCHLORIDE 2 MG/1
2 TABLET ORAL
Qty: 40 TABLET | Refills: 0 | Status: SHIPPED | OUTPATIENT
Start: 2022-04-28 | End: 2022-05-05

## 2022-04-28 RX ORDER — OXYCODONE HYDROCHLORIDE 5 MG/1
10 TABLET ORAL
Status: DISCONTINUED | OUTPATIENT
Start: 2022-04-28 | End: 2022-04-28 | Stop reason: HOSPADM

## 2022-04-28 RX ORDER — DEXAMETHASONE SODIUM PHOSPHATE 4 MG/ML
INJECTION, SOLUTION INTRA-ARTICULAR; INTRALESIONAL; INTRAMUSCULAR; INTRAVENOUS; SOFT TISSUE
Status: COMPLETED | OUTPATIENT
Start: 2022-04-28 | End: 2022-04-28

## 2022-04-28 RX ORDER — ROCURONIUM BROMIDE 10 MG/ML
INJECTION, SOLUTION INTRAVENOUS AS NEEDED
Status: DISCONTINUED | OUTPATIENT
Start: 2022-04-28 | End: 2022-04-28 | Stop reason: HOSPADM

## 2022-04-28 RX ORDER — ONDANSETRON 2 MG/ML
INJECTION INTRAMUSCULAR; INTRAVENOUS AS NEEDED
Status: DISCONTINUED | OUTPATIENT
Start: 2022-04-28 | End: 2022-04-28 | Stop reason: HOSPADM

## 2022-04-28 RX ORDER — FENTANYL CITRATE 50 UG/ML
INJECTION, SOLUTION INTRAMUSCULAR; INTRAVENOUS AS NEEDED
Status: DISCONTINUED | OUTPATIENT
Start: 2022-04-28 | End: 2022-04-28 | Stop reason: HOSPADM

## 2022-04-28 RX ORDER — KETOROLAC TROMETHAMINE 30 MG/ML
INJECTION, SOLUTION INTRAMUSCULAR; INTRAVENOUS AS NEEDED
Status: DISCONTINUED | OUTPATIENT
Start: 2022-04-28 | End: 2022-04-28 | Stop reason: HOSPADM

## 2022-04-28 RX ORDER — ROPIVACAINE HYDROCHLORIDE 5 MG/ML
INJECTION, SOLUTION EPIDURAL; INFILTRATION; PERINEURAL
Status: COMPLETED | OUTPATIENT
Start: 2022-04-28 | End: 2022-04-28

## 2022-04-28 RX ORDER — FENTANYL CITRATE 50 UG/ML
100 INJECTION, SOLUTION INTRAMUSCULAR; INTRAVENOUS ONCE
Status: COMPLETED | OUTPATIENT
Start: 2022-04-28 | End: 2022-04-28

## 2022-04-28 RX ORDER — LIDOCAINE HYDROCHLORIDE 10 MG/ML
0.3 INJECTION INFILTRATION; PERINEURAL ONCE
Status: COMPLETED | OUTPATIENT
Start: 2022-04-28 | End: 2022-04-28

## 2022-04-28 RX ORDER — MIDAZOLAM HYDROCHLORIDE 1 MG/ML
2 INJECTION, SOLUTION INTRAMUSCULAR; INTRAVENOUS
Status: COMPLETED | OUTPATIENT
Start: 2022-04-28 | End: 2022-04-28

## 2022-04-28 RX ORDER — NEOSTIGMINE METHYLSULFATE 1 MG/ML
INJECTION, SOLUTION INTRAVENOUS AS NEEDED
Status: DISCONTINUED | OUTPATIENT
Start: 2022-04-28 | End: 2022-04-28 | Stop reason: HOSPADM

## 2022-04-28 RX ORDER — PROMETHAZINE HYDROCHLORIDE 25 MG/1
25 TABLET ORAL
Qty: 20 TABLET | Refills: 0 | Status: SHIPPED | OUTPATIENT
Start: 2022-04-28 | End: 2022-05-03

## 2022-04-28 RX ORDER — LIDOCAINE HYDROCHLORIDE 20 MG/ML
INJECTION, SOLUTION EPIDURAL; INFILTRATION; INTRACAUDAL; PERINEURAL AS NEEDED
Status: DISCONTINUED | OUTPATIENT
Start: 2022-04-28 | End: 2022-04-28 | Stop reason: HOSPADM

## 2022-04-28 RX ORDER — EPHEDRINE SULFATE/0.9% NACL/PF 50 MG/5 ML
SYRINGE (ML) INTRAVENOUS AS NEEDED
Status: DISCONTINUED | OUTPATIENT
Start: 2022-04-28 | End: 2022-04-28 | Stop reason: HOSPADM

## 2022-04-28 RX ORDER — ACETAMINOPHEN 500 MG
1000 TABLET ORAL
COMMUNITY

## 2022-04-28 RX ORDER — HYDROMORPHONE HYDROCHLORIDE 2 MG/ML
0.5 INJECTION, SOLUTION INTRAMUSCULAR; INTRAVENOUS; SUBCUTANEOUS
Status: DISCONTINUED | OUTPATIENT
Start: 2022-04-28 | End: 2022-04-28 | Stop reason: HOSPADM

## 2022-04-28 RX ADMIN — FENTANYL CITRATE 100 MCG: 50 INJECTION, SOLUTION INTRAMUSCULAR; INTRAVENOUS at 07:21

## 2022-04-28 RX ADMIN — SODIUM CHLORIDE, SODIUM LACTATE, POTASSIUM CHLORIDE, AND CALCIUM CHLORIDE: 600; 310; 30; 20 INJECTION, SOLUTION INTRAVENOUS at 08:08

## 2022-04-28 RX ADMIN — Medication 3 MG: at 08:55

## 2022-04-28 RX ADMIN — Medication 2 G: at 07:57

## 2022-04-28 RX ADMIN — PROPOFOL 180 MG: 10 INJECTION, EMULSION INTRAVENOUS at 07:47

## 2022-04-28 RX ADMIN — FENTANYL CITRATE 100 MCG: 50 INJECTION INTRAMUSCULAR; INTRAVENOUS at 07:55

## 2022-04-28 RX ADMIN — ROCURONIUM BROMIDE 40 MG: 50 INJECTION, SOLUTION INTRAVENOUS at 07:48

## 2022-04-28 RX ADMIN — LIDOCAINE HYDROCHLORIDE 0.3 ML: 10 INJECTION, SOLUTION INFILTRATION; PERINEURAL at 06:07

## 2022-04-28 RX ADMIN — ONDANSETRON 4 MG: 2 INJECTION INTRAMUSCULAR; INTRAVENOUS at 08:04

## 2022-04-28 RX ADMIN — DEXAMETHASONE SODIUM PHOSPHATE 10 MG: 4 INJECTION, SOLUTION INTRAMUSCULAR; INTRAVENOUS at 08:04

## 2022-04-28 RX ADMIN — DEXAMETHASONE SODIUM PHOSPHATE 4 MG: 4 INJECTION, SOLUTION INTRAMUSCULAR; INTRAVENOUS at 07:24

## 2022-04-28 RX ADMIN — MIDAZOLAM 2 MG: 1 INJECTION INTRAMUSCULAR; INTRAVENOUS at 07:20

## 2022-04-28 RX ADMIN — KETOROLAC TROMETHAMINE 30 MG: 30 INJECTION, SOLUTION INTRAMUSCULAR; INTRAVENOUS at 08:58

## 2022-04-28 RX ADMIN — EPINEPHRINE 0.1 MG: 1 INJECTION, SOLUTION, CONCENTRATE INTRAVENOUS at 07:24

## 2022-04-28 RX ADMIN — GLYCOPYRROLATE 0.4 MG: 0.2 INJECTION, SOLUTION INTRAMUSCULAR; INTRAVENOUS at 08:55

## 2022-04-28 RX ADMIN — Medication 5 MG: at 08:18

## 2022-04-28 RX ADMIN — LIDOCAINE HYDROCHLORIDE 100 MG: 20 INJECTION, SOLUTION EPIDURAL; INFILTRATION; INTRACAUDAL; PERINEURAL at 07:47

## 2022-04-28 RX ADMIN — PROPOFOL 100 MG: 10 INJECTION, EMULSION INTRAVENOUS at 07:59

## 2022-04-28 RX ADMIN — ROPIVACAINE HYDROCHLORIDE 20 ML: 5 INJECTION, SOLUTION EPIDURAL; INFILTRATION; PERINEURAL at 07:24

## 2022-04-28 RX ADMIN — SODIUM CHLORIDE, SODIUM LACTATE, POTASSIUM CHLORIDE, AND CALCIUM CHLORIDE 100 ML/HR: 600; 310; 30; 20 INJECTION, SOLUTION INTRAVENOUS at 06:07

## 2022-04-28 RX ADMIN — PROPOFOL 100 MG: 10 INJECTION, EMULSION INTRAVENOUS at 08:08

## 2022-04-28 NOTE — ANESTHESIA PREPROCEDURE EVALUATION
Anesthetic History   No history of anesthetic complications            Review of Systems / Medical History  Patient summary reviewed, nursing notes reviewed and pertinent labs reviewed    Pulmonary            Asthma : well controlled       Neuro/Psych         Psychiatric history     Cardiovascular                  Exercise tolerance: >4 METS     GI/Hepatic/Renal  Within defined limits              Endo/Other            Comments: Gestational DM history Other Findings              Physical Exam    Airway  Mallampati: II  TM Distance: 4 - 6 cm  Neck ROM: normal range of motion   Mouth opening: Normal     Cardiovascular    Rhythm: regular  Rate: normal         Dental  No notable dental hx       Pulmonary  Breath sounds clear to auscultation               Abdominal  GI exam deferred       Other Findings            Anesthetic Plan    ASA: 2  Anesthesia type: general      Post-op pain plan if not by surgeon: peripheral nerve block single    Induction: Intravenous  Anesthetic plan and risks discussed with: Patient and Spouse

## 2022-04-28 NOTE — DISCHARGE INSTRUCTIONS
INSTRUCTIONS FOLLOWING ARTHROSCOPY SURGERY  Dr. Mili Olguin [phone]    ACTIVITY   As tolerated and as directed by your doctor   Elevate surgery site first 48 hours.  Use arm sling or crutches per your doctors instructions.  Bathe or shower as directed by your doctor. DIET   Clear liquids until no nausea or vomiting; then light diet for the first day   Advance to regular diet on second day, unless your doctor orders otherwise.  If nausea and vomiting continues, call your doctor. PAIN   Take pain medication as directed by your doctor.  Call your doctor if pain is NOT relieved by medication.  DO NOT take aspirin or blood thinners until directed by your doctor. DRESSING CARE: Follow all dressing care instructions provided by Dr. Armani Walls will be made by nursing staff.  If you have any problems or concerns, call your doctor as needed. CALL YOUR DOCTOR IF   Excessive bleeding that does not stop after holding mild pressure over the area   Temperature of 101°F or above   Redness, excessive swelling or bruising, and/or green or yellow, smelly discharge from incision    AFTER ANESTHESIA   For the next 24 hours: DO NOT Drive, Drink alcoholic beverages, or Make important decisions.  Be aware of dizziness following anesthesia and while taking pain medication. MEDICATION INTERACTION:  During your procedure you potentially received a medication or medications which may reduce the effectiveness of oral contraceptives. Please consider other forms of contraception for 1 month following your procedure if you are currently using oral contraceptives as your primary form of birth control.  In addition to this, we recommend continuing your oral contraceptive as prescribed, unless otherwise instructed by your physician, during this time    After general anesthesia or intravenous sedation, for 24 hours or while taking prescription Narcotics:  · Limit your activities  · A responsible adult needs to be with you for the next 24 hours  · Do not drive and operate hazardous machinery  · Do not make important personal or business decisions  · Do not drink alcoholic beverages  · If you have not urinated within 8 hours after discharge, please contact your surgeon on call. · If you have sleep apnea and have a CPAP machine, please use it for all naps and sleeping. · Please use caution when taking narcotics and any of your home medications that may cause drowsiness. *  Please give a list of your current medications to your Primary Care Provider. *  Please update this list whenever your medications are discontinued, doses are      changed, or new medications (including over-the-counter products) are added. *  Please carry medication information at all times in case of emergency situations. These are general instructions for a healthy lifestyle:  No smoking/ No tobacco products/ Avoid exposure to second hand smoke  Surgeon General's Warning:  Quitting smoking now greatly reduces serious risk to your health. Obesity, smoking, and sedentary lifestyle greatly increases your risk for illness  A healthy diet, regular physical exercise & weight monitoring are important for maintaining a healthy lifestyle    You may be retaining fluid if you have a history of heart failure or if you experience any of the following symptoms:  Weight gain of 3 pounds or more overnight or 5 pounds in a week, increased swelling in our hands or feet or shortness of breath while lying flat in bed. Please call your doctor as soon as you notice any of these symptoms; do not wait until your next office visit.

## 2022-04-28 NOTE — H&P
Date of Surgery Update:  Chuck Mitchell was seen and examined. History and physical has been reviewed. The patient has been examined.  There have been no significant clinical changes since the completion of the originally dated History and Physical.    Signed By: Eusebia Conner MD     April 28, 2022 6:32 AM

## 2022-04-28 NOTE — OP NOTES
[address] [address]  OPERATIVE REPORT    Name:  Valorie Castillo  MR#:  [id number]  :  1975  ACCOUNT #:  [de-identified]  DATE OF SERVICE:  2022    PREOPERATIVE DIAGNOSES:  1. Partial-thickness rotator cuff tear, left shoulder. 2.  Biceps tendinitis, left shoulder. 3.  Acromioclavicular joint arthritis, left shoulder. POSTOPERATIVE DIAGNOSES:  1. Partial-thickness rotator cuff tear, left shoulder. 2.  Biceps tendinitis, left shoulder. 3.  Acromioclavicular joint arthritis, left shoulder. 4.  Superior labrum anterior-posterior tear, left shoulder. PROCEDURES PERFORMED:  Arthroscopy of left shoulder, arthroscopic subacromial decompression, arthroscopic distal clavicle resection, extensive debridement of SLAP tear, biceps labral complex, glenohumeral joint, subacromial space, mini-open rotator cuff repair and biceps tenodesis. SURGEON:  Wilder Melendez MD      ANESTHESIA:  General with an interscalene block. COMPLICATIONS:  None. IMPLANTS:  Hardware utilized are three August 5.5 anchors, one Iconix 2.3 anchor, and one Omega double-double. ESTIMATED BLOOD LOSS:  30 mL. FINDINGS:  1. Type 2 acromion. 2.  AC joint arthritis. 3.  Type 1 SLAP tear. 4.  Biceps tendinitis. 5.  A 1.5 cm high-grade, partial-thickness, bursal-sided rotator cuff tear. CPT CODES:  07350, [id number], [id number], [id number]. ICD-10 CODES:  A28.119, M75.22, [id number], M19.012. INDICATIONS:  The patient is a 57-year-old female with recalcitrant left shoulder pain. Preoperative physical exam, radiographs, and an MR demonstrate a high-grade, partial-thickness rotator cuff tear, biceps tendinitis, AC joint arthritis. The patient has exhausted nonoperative modalities and electively admitted for operative intervention. PROCEDURE:  Following identification of the patient, the patient was taken to the operative suite.   Following administration of general anesthesia, interscalene block for postop pain control, 2 g of IV Ancef, the patient was positioned on the operative table in the supine fashion. Left shoulder was examined under anesthesia and noted to be stable through full range of motion. At this point, the patient was carefully positioned in the lateral decubitus position right side down. Axillary roll was placed. Beanbag was inflated. Care was taken to pad both dependent lower extremities. Left arm was then placed in the Dyonics traction device in 15 pounds of traction. Left shoulder was then prepped and draped in the sterile fashion. Subacromial space was then injected with 10 mL of 1% Xylocaine with epinephrine. Scope was introduced into the shoulder. Diagnostic arthroscopy was then commenced. Articular surfaces of the humeral head and glenoid were visualized and noted to be intact. Anterior, posterior, superior, and inferior labrum were visualized. There was a type 1 SLAP tear superiorly with evidence of biceps tendinopathy. There was a partial-thickness rotator cuff tear involving supraspinatus, no definitive full-thickness component. Subscapularis was intact. Posterior aspect of the cuff involving infraspinatus and teres minor were intact. Scope was then placed in the subacromial space. Lateral portal was then established. Hypertrophic hemorrhagic bursal tissue was resected. Bursal side of the cuff was visualized. There was a complex high-grade, near full-thickness, bursal-sided rotator cuff tear roughly 1.5 cm. With use of an Oratec wand and acromionizing becca, an arthroscopic subacromial decompression was then performed. This was taken down to the level of the deltoid fascia anteriorly, AC joint posteriorly, contoured from medial to lateral.  Once it was then compete, our attention was then turned to resecting the distal clavicle. The distal 10 mm and 10 mm of distal clavicle was then resected. Care was taken to preserve the posterior superior capsule.   At this point, given the combination of pathology, it was elected to perform a mini-open approach. The lateral portal was extended to 3 cm. Deltoid split was carried up to the acromion. The biceps tendon was identified. It was dissected-free. It was tagged, mobilized, brought out to length, transected, and tenodesed utilizing one 5.5 August anchor, one Iconix 2.3 anchor and oversewn using #2 Mersilene sutures. This yielded an excellent biceps tenodesis. At this point, the high-grade partial-thickness bursal-sided cuff tear was identified. It was completed, mobilized and repaired utilizing two August 5.5 anchors medially and Omega double-double laterally. This yielded an excellent cuff repair which easily brought the tissue back to the greater tuberosity. Scope was then placed back in the glenohumeral joint. Stump of the biceps and SLAP tear were debrided back to stable structures. Anatomic footprint was reestablished. Scope was then placed back on the bursal side. Bursal side of the cuff repair was stable. At this point, with the procedure complete, arthroscopic equipment was removed from the shoulder. The portals were approximated using 2-0 nylon horizontal mattress sutures. Lateral wound was closed with 0 Vicryl figure-of-eight sutures and a 2-0 Prolene subcuticular stitch. A sterile dressing was applied. A sling and swathe was applied. The patient was then transferred to the recovery room in stable condition. MD JASSON [email or web address]  D:  04/28/2022 9:14  T:  04/28/2022 14:19  JOB #:  [id number]  CC:   Livia Jewell MD

## 2022-04-28 NOTE — ANESTHESIA POSTPROCEDURE EVALUATION
Procedure(s):  ARTHROSCOPY LEFT SHOULDER ARTHROSCOPIC SUBACROMIAL DECOMPRESSION, DISTAL CLAVICLE RESECTION, EXTENSIVE DEBRIDEMENT SLAP TEAR, BICEPS LABRAL COMPLEX, GLENOHUMERAL JOINT, SUBACROMIAL SPACE, MINI OPEN ROTATOR CUFF REPAIR, BICEPS TENODESIS. general    Anesthesia Post Evaluation      Multimodal analgesia: multimodal analgesia used between 6 hours prior to anesthesia start to PACU discharge  Patient location during evaluation: PACU  Patient participation: complete - patient participated  Level of consciousness: awake and alert  Pain management: adequate  Airway patency: patent  Anesthetic complications: no  Cardiovascular status: acceptable  Respiratory status: acceptable  Hydration status: acceptable  Post anesthesia nausea and vomiting:  none      INITIAL Post-op Vital signs:   Vitals Value Taken Time   /82 04/28/22 0950   Temp 36.6 °C (97.8 °F) 04/28/22 0915   Pulse 72 04/28/22 0949   Resp 16 04/28/22 0945   SpO2 91 % 04/28/22 0949   Vitals shown include unvalidated device data.

## 2022-04-28 NOTE — PERIOP NOTES
Discharge instructions reviewed with pt and family member who verbalize understanding of follow up care. Patient instructed on how to use incentive spirometer, patient demonstrated.

## 2022-04-28 NOTE — BRIEF OP NOTE
BRIEF OPERATIVE NOTE    Date of Procedure: 4/28/2022     Preoperative Diagnosis:  PARTIAL THICKNESS ROTATOR CUFF TEAR LEFT SHOULDER      BICEPS TENDINITIS LEFT SHOULDER      AC OA LEFT SHOULDER    Postoperative Diagnosis:  SAME      SLAP TEAR LEFT SHOULDER    Procedure(s): ARTHROSCOPY LEFT SHOULDER ARTHROSCOPIC SUBACROMIAL DECOMPRESSION, DISTAL CLAVICLE RESECTION, EXTENSIVE DEBRIDEMENT SLAP TEAR, BICEPS LABRAL COMPLEX, GLENOHUMERAL JOINT, SUBACROMIAL SPACE, MINI OPEN ROTATOR CUFF REPAIR, BICEPS TENODESIS    Surgeon(s) and Role:     * Dk Yip MD - Primary         Assistant Staff:  NONE      Surgical Staff:  Circ-1: Eileen Mitchell RN  Scrub Tech-1: Logan DIAS  Scrub Tech-2: Andre AREVALO  Event Time In   Incision Start 0804   Incision Close 8031       Anesthesia:  GENERAL WITH INTERSCALENE BLOCK    Estimated Blood Loss: 30 CC. Complications: NONE    Implants:   Implant Name Type Inv. Item Serial No.  Lot No. LRB No. Used Action   ANCHOR SUT DIA5. 5MM PEEK ZIP W/ NDL - [id number]  ANCHOR SUT DIA5. 5MM PEEK ZIP W/ NDL  LAY ENDOSCOPY_WD [id number] Left 3 Implanted   ANCHOR SUT DIA2.3MM W/ NDL 2 STRND NO2 FOR FBR - [id number]  ANCHOR SUT DIA2.3MM W/ NDL 2 STRND NO2 FORC FBR  LAY ENDOSCOPY_WD [id number] Left 1 Implanted   ANCHOR SUTURE SINGLE 4.75 MM PEEK KNOTLESS SYS OMEGA - [id number]  ANCHOR SUTURE SINGLE 4.75 MM PEEK KNOTLESS SYS OMEGA  LAY ENDOSCOPY_WD [id number] Left 2 Implanted       Natasha Gaines MD

## 2022-04-28 NOTE — ANESTHESIA PROCEDURE NOTES
Peripheral Block    Start time: 4/28/2022 7:20 AM  End time: 4/28/2022 7:24 AM  Performed by: Maryan Garcia MD  Authorized by: Maryan Garcia MD       Pre-procedure: Indications: at surgeon's request and post-op pain management    Preanesthetic Checklist: patient identified, risks and benefits discussed, site marked, timeout performed, anesthesia consent given and patient being monitored    Timeout Time: 07:20 EDT          Block Type:   Block Type:   Interscalene  Laterality:  Left  Monitoring:  Continuous pulse ox, frequent vital sign checks, heart rate, responsive to questions and oxygen  Injection Technique:  Single shot  Procedures: ultrasound guided    Patient Position: supine (head elevated 45 degrees)  Prep: chlorhexidine    Location:  Interscalene  Needle Type:  Stimuplex  Needle Gauge:  20 G  Needle Localization:  Ultrasound guidance  Medication Injected:  Ropivacaine (PF) (NAROPIN)(0.5%) 5 mg/mL injection, 20 mL  EPINEPHrine HCl (PF) (ADRENALIN) 1 mg/mL (1 mL) injection, 0.1 mg  dexamethasone (DECADRON) 4 mg/mL injection, 4 mg  Med Admin Time: 4/28/2022 7:24 AM    Assessment:  Number of attempts:  1  Injection Assessment:  Incremental injection every 5 mL, local visualized surrounding nerve on ultrasound, negative aspiration for blood, no paresthesia, ultrasound image on chart and no intravascular symptoms  Patient tolerance:  Patient tolerated the procedure well with no immediate complications

## 2022-05-23 ENCOUNTER — OFFICE VISIT (OUTPATIENT)
Dept: ORTHOPEDIC SURGERY | Age: 47
End: 2022-05-23

## 2022-05-23 DIAGNOSIS — Z48.89 ENCOUNTER FOR POSTOPERATIVE CARE: Primary | ICD-10-CM

## 2022-05-23 PROCEDURE — 99024 POSTOP FOLLOW-UP VISIT: CPT | Performed by: ORTHOPAEDIC SURGERY

## 2022-05-23 NOTE — PROGRESS NOTES
Progress Notes by Marlene Oakley MD at 05/16/22 0800              Author: Marlene Oakley MD  Service: --  Author Type: Physician      Filed: 05/16/22 0749  Encounter Date: 5/16/2022  Status: Signed         : Marlene Oakley MD (Physician)                          Name: Yary Castellanos   YOB: 1975   Gender: female   MRN: [id number]                  HPI: Yary Castellanos is a  42 y.o. ambidextrous female 3.5 weeks status post arthroscopy left shoulder ASD,  ADCR, extensive debridement SLAP tear, biceps labral complex, glenohumeral joint, subacromial space, mini open rotator  cuff repair and biceps tenodesis. Operative findings were notable for a 1.5 cm high-grade partial-thickness bursal sided rotator cuff tear. Her postop course was complicated by a wound dehiscence. She has been doing local wound care      ROS/Meds/PSH/PMH/FH/SH: A ten system review of systems was performed and is negative other than what is in the HPI. Tobacco:  reports that she has never smoked. She has never used smokeless tobacco.   Visit Vitals     [facility]  04/20/2022            Physical Examination:   She is an awake alert pleasant female ambulating without difficulty she is wearing a walking boot on the right foot   Full range of cervical spine motion without radicular findings      The right shoulder has 0 to 180 degrees of active and 0 to 180 degrees passive forward elevation. Internal rotation is to T6. External rotation is to 60 degrees at the side. In the 90 degree abducted position 90 degrees of external and 90 degrees internal rotation   The AC joint is non-tender   SC joint is non-tender. Greater tuberosity is non-tender. negative biceps   Negative O'Briens sign   negative lift-off sign   Negative belly press sign   Negative bear huggers sign   negative drop sign   negative hornblower's sign   No posterior glenohumeral joint line tenderness.     No evident excessive external rotation   Rotator cuff strength is 5/5.   negative external rotation stress test.    Negative empty can sign   There is no evident anterior or posterior apprehension with a negative sulcus sign. No instability   negative external and internal Rotation lag sign   Neurovascularly intact. The left shoulder incisions is healing   No purulence. No erythema. Minimal bruising. Passive forward elevation is 0-120   ER to 30   Biceps has good cosmetic appearance   She can fully extend the left elbow   She is neurovascularly intact            Data Reviewed:               Impression:      1. Encounter for postoperative care            status post arthroscopy left shoulder ASD,  ADCR, extensive debridement SLAP tear, biceps labral complex, glenohumeral joint, subacromial space,  mini open rotator cuff repair and biceps tenodesis 3.5 weeks     rule out SLAP tear left shoulder     Hashimoto's thyroiditis     Status post right foot surgery      Plan:    I discussed the plan with the patient. I do not see any signs of infection and her wound is healing. We will observe her. No antibiotics for now. She will continue therapy. I will recheck her back in 2 weeks      Follow up: Follow-up and Dispositions     ·   Return in about 1 week (around 5/23/2022).                            Xavier Alberts MD

## 2022-06-04 ENCOUNTER — HOSPITAL ENCOUNTER (OUTPATIENT)
Dept: MAMMOGRAPHY | Age: 47
Discharge: HOME | End: 2022-06-07
Payer: COMMERCIAL

## 2022-06-04 DIAGNOSIS — Z12.31 VISIT FOR SCREENING MAMMOGRAM: ICD-10-CM

## 2022-06-04 PROCEDURE — 77063 BREAST TOMOSYNTHESIS BI: CPT

## 2022-06-06 ENCOUNTER — OFFICE VISIT (OUTPATIENT)
Dept: ORTHOPEDIC SURGERY | Age: 47
End: 2022-06-06

## 2022-06-06 DIAGNOSIS — Z48.89 ENCOUNTER FOR POSTOPERATIVE CARE: Primary | ICD-10-CM

## 2022-06-06 PROCEDURE — 99024 POSTOP FOLLOW-UP VISIT: CPT | Performed by: ORTHOPAEDIC SURGERY

## 2022-06-29 DIAGNOSIS — R92.2 DENSE BREAST TISSUE ON MAMMOGRAM: Primary | ICD-10-CM

## 2022-06-30 ENCOUNTER — HOSPITAL ENCOUNTER (OUTPATIENT)
Dept: MRI IMAGING | Age: 47
Discharge: HOME | End: 2022-07-03
Payer: COMMERCIAL

## 2022-06-30 DIAGNOSIS — R92.2 DENSE BREAST TISSUE: ICD-10-CM

## 2022-06-30 PROCEDURE — 6360000004 HC RX CONTRAST MEDICATION: Performed by: OBSTETRICS & GYNECOLOGY

## 2022-06-30 PROCEDURE — 2580000003 HC RX 258: Performed by: OBSTETRICS & GYNECOLOGY

## 2022-06-30 PROCEDURE — A9579 GAD-BASE MR CONTRAST NOS,1ML: HCPCS | Performed by: OBSTETRICS & GYNECOLOGY

## 2022-06-30 PROCEDURE — C8908 MRI W/O FOL W/CONT, BREAST,: HCPCS

## 2022-06-30 RX ORDER — SODIUM CHLORIDE 0.9 % (FLUSH) 0.9 %
30 SYRINGE (ML) INJECTION AS NEEDED
Status: DISCONTINUED | OUTPATIENT
Start: 2022-06-30 | End: 2022-07-04 | Stop reason: HOSPADM

## 2022-06-30 RX ADMIN — SODIUM CHLORIDE, PRESERVATIVE FREE 30 ML: 5 INJECTION INTRAVENOUS at 12:09

## 2022-06-30 RX ADMIN — GADOTERIDOL 18 ML: 279.3 INJECTION, SOLUTION INTRAVENOUS at 12:08

## 2022-06-30 NOTE — PROGRESS NOTES
Progress Notes by Dev Coyle MD at 05/16/22 0800              Author: Dev Coyle MD  Service: --  Author Type: Physician      Filed: 05/16/22 0749  Encounter Date: 5/16/2022  Status: Signed         : Dev Coyle MD (Physician)                          Name: Meryle Laurence   YOB: 1975   Gender: female   MRN: [id number]                  HPI: Meryle Laurence is a  42 y.o. ambidextrous female 6 weeks status post arthroscopy left shoulder ASD,  ADCR, extensive debridement SLAP tear, biceps labral complex, glenohumeral joint, subacromial space, mini open rotator  cuff repair and biceps tenodesis. Operative findings were notable for a 1.5 cm high-grade partial-thickness bursal sided rotator cuff tear. Her postop course was complicated by a wound dehiscence. She has been doing local wound care. The wound has healed. She is doing much better. ROS/Meds/PSH/PMH/FH/SH: A ten system review of systems was performed and is negative other than what is in the HPI. Tobacco:  reports that she has never smoked. She has never used smokeless tobacco.   Visit Vitals     [facility]  04/20/2022            Physical Examination:   She is an awake alert pleasant female ambulating without difficulty she is wearing a walking boot on the right foot   Full range of cervical spine motion without radicular findings      The right shoulder has 0 to 180 degrees of active and 0 to 180 degrees passive forward elevation. Internal rotation is to T6. External rotation is to 60 degrees at the side. In the 90 degree abducted position 90 degrees of external and 90 degrees internal rotation   The AC joint is non-tender   SC joint is non-tender. Greater tuberosity is non-tender.    negative biceps   Negative O'Briens sign   negative lift-off sign   Negative belly press sign   Negative bear huggers sign   negative drop sign   negative hornblower's sign   No posterior glenohumeral joint line tenderness. No evident excessive external rotation   Rotator cuff strength is 5/5.   negative external rotation stress test.    Negative empty can sign   There is no evident anterior or posterior apprehension with a negative sulcus sign. No instability   negative external and internal Rotation lag sign   Neurovascularly intact. The left shoulder incisions have healed   No purulence. No erythema. Active forward elevation left shoulder is 0-1 40   Passive forward elevation is 0-160   ER to 40   the Biceps has good cosmetic appearance   She is neurovascularly intact            Data Reviewed:               Impression:      1. Encounter for postoperative care            status post arthroscopy left shoulder ASD,  ADCR, extensive debridement SLAP tear, biceps labral complex, glenohumeral joint, subacromial space,  mini open rotator cuff repair and biceps tenodesis 6 weeks     rule out SLAP tear left shoulder     Hashimoto's thyroiditis     Status post right foot surgery      Plan:    I discussed the plan with the patient. Her wound has healed and she is doing much better. We will now advance her to full motion full-strength in therapy. I will recheck her back in 6 weeks   Follow up:      Follow-up and Dispositions     ·   Return in about 6 weeks                           Khris Bassett MD 88

## 2022-07-07 ENCOUNTER — HOSPITAL ENCOUNTER (OUTPATIENT)
Dept: MAMMOGRAPHY | Age: 47
Discharge: HOME | End: 2022-07-10
Payer: COMMERCIAL

## 2022-07-07 DIAGNOSIS — Z80.3 FAMILY HISTORY OF BREAST CANCER: ICD-10-CM

## 2022-07-07 DIAGNOSIS — R92.8 ABNORMAL MRI, BREAST: ICD-10-CM

## 2022-07-07 PROCEDURE — 76642 ULTRASOUND BREAST LIMITED: CPT

## 2022-07-12 ENCOUNTER — APPOINTMENT (OUTPATIENT)
Dept: MAMMOGRAPHY | Age: 47
End: 2022-07-12
Payer: COMMERCIAL

## 2022-07-12 ENCOUNTER — HOSPITAL ENCOUNTER (OUTPATIENT)
Dept: MAMMOGRAPHY | Age: 47
Discharge: HOME | End: 2022-07-15
Payer: COMMERCIAL

## 2022-07-12 VITALS — DIASTOLIC BLOOD PRESSURE: 82 MMHG | HEART RATE: 82 BPM | SYSTOLIC BLOOD PRESSURE: 141 MMHG

## 2022-07-12 DIAGNOSIS — R92.8 ABNORMAL ULTRASOUND OF BREAST: ICD-10-CM

## 2022-07-12 DIAGNOSIS — N63.0 BREAST MASS: ICD-10-CM

## 2022-07-12 PROCEDURE — 88305 TISSUE EXAM BY PATHOLOGIST: CPT

## 2022-07-12 PROCEDURE — 77065 DX MAMMO INCL CAD UNI: CPT

## 2022-07-12 PROCEDURE — 2709999900 US BREAST BIOPSY W LOC DEVICE 1ST LESION RIGHT

## 2022-07-12 PROCEDURE — 2500000003 HC RX 250 WO HCPCS: Performed by: OBSTETRICS & GYNECOLOGY

## 2022-07-12 RX ORDER — LIDOCAINE HYDROCHLORIDE 10 MG/ML
4 INJECTION, SOLUTION INFILTRATION; PERINEURAL ONCE
Status: COMPLETED | OUTPATIENT
Start: 2022-07-12 | End: 2022-07-12

## 2022-07-12 RX ADMIN — LIDOCAINE HYDROCHLORIDE 4 ML: 10 INJECTION, SOLUTION INFILTRATION; PERINEURAL at 09:51

## 2022-07-12 ASSESSMENT — PAIN DESCRIPTION - INTENSITY
RATING_2: 0
RATING_3: 0

## 2022-07-12 ASSESSMENT — PAIN SCALES - GENERAL: PAINLEVEL_OUTOF10: 0

## 2022-07-13 ENCOUNTER — TELEPHONE (OUTPATIENT)
Dept: MAMMOGRAPHY | Age: 47
End: 2022-07-13

## 2022-07-18 ENCOUNTER — TELEPHONE (OUTPATIENT)
Dept: OBGYN CLINIC | Age: 47
End: 2022-07-18

## 2022-07-19 ENCOUNTER — OFFICE VISIT (OUTPATIENT)
Dept: ORTHOPEDIC SURGERY | Age: 47
End: 2022-07-19

## 2022-07-19 DIAGNOSIS — Z48.89 ENCOUNTER FOR POSTOPERATIVE CARE: Primary | ICD-10-CM

## 2022-07-19 PROCEDURE — 99024 POSTOP FOLLOW-UP VISIT: CPT | Performed by: ORTHOPAEDIC SURGERY

## 2022-07-19 NOTE — PROGRESS NOTES
Progress Notes by Uli Pearce MD at 05/16/22 0800                Author: Uli Pearce MD  Service: --  Author Type: Physician      Filed: 05/16/22 0749  Encounter Date: 5/16/2022  Status: Signed         : Uli Pearce MD (Physician)                            Name: Summer Doan   YOB: 1975   Gender: female   MRN: [id number]                  HPI: Summer Doan is a  42 y.o. ambidextrous female almost 3 months status post arthroscopy left shoulder ASD,  ADCR, extensive debridement SLAP tear, biceps labral complex, glenohumeral joint, subacromial space, mini open rotator  cuff repair and biceps tenodesis. Operative findings were notable for a 1.5 cm high-grade partial-thickness bursal sided rotator cuff tear. Her postop course was complicated by a wound dehiscence. The wound is healed. She is doing much better. ROS/Meds/PSH/PMH/FH/SH: A ten system review of systems was performed and is negative other than what is in the HPI. Tobacco:  reports that she has never smoked. She has never used smokeless tobacco.   Visit Vitals     [facility]  04/20/2022            Physical Examination:   She is an awake alert pleasant female ambulating without difficulty she is wearing a walking boot on the right foot   Full range of cervical spine motion without radicular findings      The right shoulder has 0 to 180 degrees of active and 0 to 180 degrees passive forward elevation. Internal rotation is to T6. External rotation is to 60 degrees at the side. In the 90 degree abducted position 90 degrees of external and 90 degrees internal rotation   The AC joint is non-tender   SC joint is non-tender. Greater tuberosity is non-tender.    negative biceps   Negative O'Briens sign   negative lift-off sign   Negative belly press sign   Negative bear huggers sign   negative drop sign   negative hornblower's sign   No posterior glenohumeral joint line about 6 weeks                           Jorge Pope MD

## 2022-08-30 ENCOUNTER — OFFICE VISIT (OUTPATIENT)
Dept: ORTHOPEDIC SURGERY | Age: 47
End: 2022-08-30
Payer: COMMERCIAL

## 2022-08-30 DIAGNOSIS — Z47.89 ORTHOPEDIC AFTERCARE: Primary | ICD-10-CM

## 2022-08-30 PROCEDURE — 99212 OFFICE O/P EST SF 10 MIN: CPT | Performed by: ORTHOPAEDIC SURGERY

## 2022-08-30 NOTE — PROGRESS NOTES
Progress Notes by Shante Arreguin MD at 05/16/22 0800                Author: Shante Arreguin MD  Service: --  Author Type: Physician      Filed: 05/16/22 0749  Encounter Date: 5/16/2022  Status: Signed         : Shante Arreguin MD (Physician)                            Name: Cholo Nunn   YOB: 1975   Gender: female   MRN: [id number]                  HPI: Cholo Nunn is a  42 y.o. ambidextrous female almost 4 months status post arthroscopy left shoulder ASD,  ADCR, extensive debridement SLAP tear, biceps labral complex, glenohumeral joint, subacromial space, mini open rotator  cuff repair and biceps tenodesis. Operative findings were notable for a 1.5 cm high-grade partial-thickness bursal sided rotator cuff tear. Her postop course was complicated by a wound dehiscence. The wound is healed. She is doing fantastic      ROS/Meds/PSH/PMH/FH/SH: A ten system review of systems was performed and is negative other than what is in the HPI. Tobacco:  reports that she has never smoked. She has never used smokeless tobacco.   Visit Vitals     LMP  04/20/2022            Physical Examination:   She is an awake alert pleasant female ambulating without difficulty she is wearing a walking boot on the right foot   Full range of cervical spine motion without radicular findings      The right shoulder has 0 to 180 degrees of active and 0 to 180 degrees passive forward elevation. Internal rotation is to T6. External rotation is to 60 degrees at the side. In the 90 degree abducted position 90 degrees of external and 90 degrees internal rotation   The AC joint is non-tender   SC joint is non-tender. Greater tuberosity is non-tender. negative biceps   Negative O'Briens sign   negative lift-off sign   Negative belly press sign   Negative bear huggers sign   negative drop sign   negative hornblower's sign   No posterior glenohumeral joint line tenderness. No evident excessive external rotation   Rotator cuff strength is 5/5.   negative external rotation stress test.    Negative empty can sign   There is no evident anterior or posterior apprehension with a negative sulcus sign. No instability   negative external and internal Rotation lag sign   Neurovascularly intact. The left shoulder incisions have healed  The left shoulder has 0 to 180 degrees of active and 0 to 180 degrees passive forward elevation. Internal rotation is to T6. External rotation is to 60 degrees at the side. In the 90 degree abducted position 90 degrees of external and 90 degrees internal rotation  The AC joint is non-tender  SC joint is non-tender. Greater tuberosity is non-tender. negative biceps  Negative O'Briens sign  negative lift-off sign  Negative belly press sign  Negative bear huggers sign  negative drop sign  negative hornblower's sign  No posterior glenohumeral joint line tenderness. No evident excessive external rotation  Rotator cuff strength is 5/5.  negative external rotation stress test.   Negative empty can sign  There is no evident anterior or posterior apprehension with a negative sulcus sign. No instability  negative external and internal Rotation lag sign  Neurovascularly intact. Data Reviewed:               Impression:      1. Encounter for postoperative care           status post arthroscopy left shoulder ASD,  ADCR, extensive debridement SLAP tear, biceps labral complex, glenohumeral joint, subacromial space,  mini open rotator cuff repair and biceps tenodesis almost 4 months    rule out SLAP tear left shoulder    Hashimoto's thyroiditis    Status post right foot surgery      Plan:    I discussed the plan with the patient. She has had a fantastic result. She will continue to rehab the left shoulder. She can do activities as tolerated. I will recheck her back on an as-needed basis    2.   Self-limited problem        Follow-up and Yusra Moore MD

## 2022-09-01 ENCOUNTER — OFFICE VISIT (OUTPATIENT)
Dept: ENDOCRINOLOGY | Age: 47
End: 2022-09-01
Payer: COMMERCIAL

## 2022-09-01 VITALS
SYSTOLIC BLOOD PRESSURE: 136 MMHG | DIASTOLIC BLOOD PRESSURE: 92 MMHG | WEIGHT: 195 LBS | HEART RATE: 84 BPM | OXYGEN SATURATION: 98 % | BODY MASS INDEX: 33.47 KG/M2

## 2022-09-01 DIAGNOSIS — E06.3 HASHIMOTO'S THYROIDITIS: ICD-10-CM

## 2022-09-01 DIAGNOSIS — E04.1 THYROID NODULE: Primary | ICD-10-CM

## 2022-09-01 PROCEDURE — 76536 US EXAM OF HEAD AND NECK: CPT | Performed by: INTERNAL MEDICINE

## 2022-09-01 PROCEDURE — 99214 OFFICE O/P EST MOD 30 MIN: CPT | Performed by: INTERNAL MEDICINE

## 2022-09-01 ASSESSMENT — ENCOUNTER SYMPTOMS
CONSTIPATION: 0
DIARRHEA: 0

## 2022-09-01 NOTE — PROGRESS NOTES
Konrad Teixeira MD, [facility]  [address], [address]  Phone [phone]  Facsimile [phone]          Jessi Salas is a 42 y.o. female seen 9/1/2022 for follow-up of thyroid nodule and Hashimoto's thyroiditis        ASSESSMENT AND PLAN:    1. Thyroid nodule  Thyroid ultrasound performed today revealed that the superior right lobe nodule versus pseudonodule was stable to slightly smaller in size. I will have her return in 1 year for a follow-up ultrasound to document stability. 2. Hashimoto's thyroiditis  Based on her positive family history of thyroid disease, thyroid ultrasound appearance and positive thyroid peroxidase and thyroglobulin antibodies, she has Hashimoto's thyroiditis. She is currently biochemically euthyroid. We have discussed that Hashimoto's thyroiditis confers an increased risk for the development of hypothyroidism and I recommend monitoring her thyroid function tests once a year. Procedures:    Thyroid ultrasound 9/1/2022: Right lobe 1.73 x 2.04 x 4.76 cm, heterogeneous echotexture. There are multiple hypoechoic areas likely representing pseudonodules. In the superior right lobe there is a hypoechoic nodule versus pseudonodule measuring 0.62 x 0.72 x 1.18 cm without microcalcifications (TR 4). Isthmus measures 0.37 cm. Left lobe 1.79 x 1.83 x 4.95 cm, heterogeneous echotexture. There are multiple hypoechoic areas likely representing pseudo nodules. Follow-up and Dispositions    Return in about 1 year (around 9/1/2023). HISTORY OF PRESENT ILLNESS:    THYROID NODULE / MULTINODULAR GOITER     Presentation: She has a history of postpartum thyroiditis in 2016. She was noted to have a thyroid nodule in 8/2021 during the evaluation of Hashimoto's thyroiditis. Thyroid Cancer Risk Factors: There is no family history of thyroid cancer.   There is no history of radiation to the head/neck. Symptoms:  Denies neck lumps. She reports occasional dysphagia. Denies positional shortness of breath. She reports hoarseness. Imaging:  Thyroid ultrasound 10/4/2016: Right lobe 1.92 x 2.46 x 6.02 cm, markedly heterogeneous echotexture with multiple hypoechoic areas likely representing pseudo-nodules. There is markedly increased vascularity throughout the right lobe. Isthmus measures 0.39 cm. Left lobe 1.73 x 1.92 x 4.82 cm, heterogeneous echotexture with multiple hypoechoic areas likely representing pseudo-nodules. There is markedly increased vascularity throughout the left lobe. Thyroid ultrasound 8/13/2021 [facility]): Right lobe 5.7 x 1.9 x 1.8 cm, left lobe 4.4 x 1.7 x 1.4 cm, isthmus measures 0.4 cm. There is a hypoechoic nodule in the superior right lobe measuring 0.9 x 0.8 x 1.3 cm (TR 4). There is diffuse heterogeneous echotexture with hyperemia. Limited thyroid ultrasound 9/21/2021: The thyroid gland is diffusely heterogeneous with multiple hypoechoic areas bilaterally likely representing pseudonodules. In the superior right lobe there is a hypoechoic nodule versus pseudonodule measuring 0.54 x 0.83 x 1.35 cm without microcalcifications (TR 4). Thyroid ultrasound 9/1/2022: Right lobe 1.73 x 2.04 x 4.76 cm, heterogeneous echotexture. There are multiple hypoechoic areas likely representing pseudonodules. In the superior right lobe there is a hypoechoic nodule versus pseudonodule measuring 0.62 x 0.72 x 1.18 cm without microcalcifications (TR 4). Isthmus measures 0.37 cm. Left lobe 1.79 x 1.83 x 4.95 cm, heterogeneous echotexture. There are multiple hypoechoic areas likely representing pseudo nodules.      Labs:  8/30/2016: TSH 0.022, free T4 1.22, 25-OH vitamin D 20.2.  10/4/2016: TSH 7.510, free T4 0.68, free T3 2.5,  thyroid peroxidase antibodies 348, thyroid-stimulating immunoglobulins 48.  12/2/2016: TSH 1.680, free T4 0.86, 25-OH vitamin D 35.8.  7/30/2021: TSH 2.13, free T4 0.86, free T3 2.9, thyroid peroxidase antibodies 142, thyroglobulin antibody 8.5.  8/11/2022: TSH 1.660. Review of Systems   Constitutional:  Positive for diaphoresis (occasionally) and fatigue (intermittent). Weight increased 15 pounds since last visit. Cardiovascular:  Positive for palpitations (occasionally). Gastrointestinal:  Negative for constipation and diarrhea. Endocrine: Negative for cold intolerance and heat intolerance. Genitourinary:  Negative for menstrual problem. Neurological:  Positive for tremors (occasionally). Vital Signs:  BP (!) 136/92 (Site: Left Upper Arm, Position: Sitting)   Pulse 84   Wt 195 lb (88.5 kg)   SpO2 98%   BMI 33.47 kg/m²     Wt Readings from Last 3 Encounters:   09/01/22 195 lb (88.5 kg)   06/30/22 190 lb (86.2 kg)   05/10/22 190 lb 12.8 oz (86.5 kg)       Physical Exam  Constitutional:       General: She is not in acute distress. Neck:      Thyroid: No thyroid mass or thyromegaly. Cardiovascular:      Rate and Rhythm: Normal rate and regular rhythm. Lymphadenopathy:      Cervical: No cervical adenopathy. Neurological:      Motor: No tremor. Orders Placed This Encounter   Procedures    TSH with Reflex     Standing Status:   Future     Standing Expiration Date:   3/1/2024    US, HEAD/NECK TISSUES,REAL TIME       Current Outpatient Medications   Medication Sig Dispense Refill    albuterol sulfate  (90 Base) MCG/ACT inhaler INHALE 2 PUFFS BY MOUTH EVERY 6 HOURS AS NEEDED      Cholecalciferol 50 MCG (2000 UT) CAPS Take 1,200 Units by mouth daily      diphenhydrAMINE (SOMINEX) 25 MG tablet Take 75 mg by mouth daily      ibuprofen (ADVIL;MOTRIN) 800 MG tablet Take 800 mg by mouth every 8 hours as needed       No current facility-administered medications for this visit.

## 2022-11-30 ENCOUNTER — PATIENT MESSAGE (OUTPATIENT)
Dept: OBGYN CLINIC | Age: 47
End: 2022-11-30

## 2022-11-30 DIAGNOSIS — R92.8 OTHER ABNORMAL AND INCONCLUSIVE FINDINGS ON DIAGNOSTIC IMAGING OF BREAST: Primary | ICD-10-CM

## 2022-11-30 NOTE — TELEPHONE ENCOUNTER
From: Elizabeth Zee  To: Dr. Bobbetta Burkitt: 11/30/2022 8:06 AM EST  Subject: schedule a 6 month follow up with Kadi Lazo,    This is Maggie Mcguire, [phone], 14-, In july 2022, marcela hidalgo had put it in [place]" for a concerned breast mass (non cancerous) thankfully and told me to come back in 6 months for another ultra sound to make sure it isn't getting any bigger. But i called [facility] and they said that you have to send in a request to do that and then they will call me to schedule it. I would also like them to check my left breast again. I have a mass there that they have looked at and weren't concerned. but not it hurts all the time and it feels like my lymph node next to my left breast is swollen and it hurts. i would like for them to do the ultra sound on that again to make sure that hasn't gotten any bigger. Thank you. Maggie Mcguire.

## 2022-12-19 ENCOUNTER — HOSPITAL ENCOUNTER (OUTPATIENT)
Dept: MAMMOGRAPHY | Age: 47
Discharge: HOME | End: 2022-12-22
Payer: COMMERCIAL

## 2022-12-19 ENCOUNTER — APPOINTMENT (OUTPATIENT)
Dept: MAMMOGRAPHY | Age: 47
End: 2022-12-19
Payer: COMMERCIAL

## 2022-12-19 DIAGNOSIS — R92.8 OTHER ABNORMAL AND INCONCLUSIVE FINDINGS ON DIAGNOSTIC IMAGING OF BREAST: ICD-10-CM

## 2022-12-19 PROCEDURE — 76642 ULTRASOUND BREAST LIMITED: CPT

## 2023-05-09 SDOH — ECONOMIC STABILITY: INCOME INSECURITY: HOW HARD IS IT FOR YOU TO PAY FOR THE VERY BASICS LIKE FOOD, HOUSING, MEDICAL CARE, AND HEATING?: NOT HARD AT ALL

## 2023-05-09 SDOH — ECONOMIC STABILITY: TRANSPORTATION INSECURITY
IN THE PAST 12 MONTHS, HAS LACK OF TRANSPORTATION KEPT YOU FROM MEETINGS, WORK, OR FROM GETTING THINGS NEEDED FOR DAILY LIVING?: NO

## 2023-05-09 SDOH — ECONOMIC STABILITY: FOOD INSECURITY: WITHIN THE PAST 12 MONTHS, THE FOOD YOU BOUGHT JUST DIDN'T LAST AND YOU DIDN'T HAVE MONEY TO GET MORE.: NEVER TRUE

## 2023-05-09 SDOH — ECONOMIC STABILITY: FOOD INSECURITY: WITHIN THE PAST 12 MONTHS, YOU WORRIED THAT YOUR FOOD WOULD RUN OUT BEFORE YOU GOT MONEY TO BUY MORE.: NEVER TRUE

## 2023-05-09 SDOH — ECONOMIC STABILITY: HOUSING INSECURITY
IN THE LAST 12 MONTHS, WAS THERE A TIME WHEN YOU DID NOT HAVE A STEADY PLACE TO SLEEP OR SLEPT IN A SHELTER (INCLUDING NOW)?: NO

## 2023-06-06 DIAGNOSIS — N63.20 MASS OF LEFT BREAST, UNSPECIFIED QUADRANT: Primary | ICD-10-CM

## 2023-06-06 DIAGNOSIS — N63.10 MASS OF RIGHT BREAST, UNSPECIFIED QUADRANT: Primary | ICD-10-CM

## 2023-06-19 ENCOUNTER — OFFICE VISIT (OUTPATIENT)
Dept: OBGYN CLINIC | Age: 48
End: 2023-06-19
Payer: COMMERCIAL

## 2023-06-19 VITALS
WEIGHT: 192 LBS | BODY MASS INDEX: 32.78 KG/M2 | HEIGHT: 64 IN | SYSTOLIC BLOOD PRESSURE: 130 MMHG | DIASTOLIC BLOOD PRESSURE: 82 MMHG

## 2023-06-19 DIAGNOSIS — Z13.29 SCREENING FOR THYROID DISORDER: ICD-10-CM

## 2023-06-19 DIAGNOSIS — Z01.419 WELL WOMAN EXAM: Primary | ICD-10-CM

## 2023-06-19 DIAGNOSIS — Z13.89 SCREENING FOR GENITOURINARY CONDITION: ICD-10-CM

## 2023-06-19 LAB
BILIRUBIN, URINE, POC: NEGATIVE
BLOOD URINE, POC: NORMAL
GLUCOSE URINE, POC: NEGATIVE
KETONES, URINE, POC: NEGATIVE
LEUKOCYTE ESTERASE, URINE, POC: NEGATIVE
NITRITE, URINE, POC: NEGATIVE
PH, URINE, POC: 7 (ref 4.6–8)
PROTEIN,URINE, POC: NEGATIVE
SPECIFIC GRAVITY, URINE, POC: 1.01 (ref 1–1.03)
T4 FREE SERPL-MCNC: 0.9 NG/DL (ref 0.78–1.46)
TSH, 3RD GENERATION: 1.12 UIU/ML (ref 0.36–3.74)
URINALYSIS CLARITY, POC: CLEAR
URINALYSIS COLOR, POC: YELLOW
UROBILINOGEN, POC: NORMAL

## 2023-06-19 PROCEDURE — 81002 URINALYSIS NONAUTO W/O SCOPE: CPT | Performed by: OBSTETRICS & GYNECOLOGY

## 2023-06-19 PROCEDURE — 99396 PREV VISIT EST AGE 40-64: CPT | Performed by: OBSTETRICS & GYNECOLOGY

## 2023-06-19 NOTE — PROGRESS NOTES
HPI:  Ms. Cates Wrenshall is a 41 y.o. female [id number] who is here today for a well woman exam. She complains of migraines, nausea, abdominal pain, bloating, breast swelling about a week before her period. In May she was scheduled to have TSH and T4 drawn but she had to cancel because she was sick. Nodules on her thyroid that were found last year. She has a follow up in September. Sounds like perimenopausal transition. Could consider estrogen patch before cycle to help migraines but elevated risk breast cancer. For now, pt would like to check thyroid. Date Performed Result   PAP 05/10/2022 Negative. HPV Negative. Mammogram 2022 Extremely dense; BD2   Colonoscopy NA NA   Dexa NA NA     OB History          3    Para   2    Term   2            AB   1    Living   2         SAB   1    IAB        Ectopic        Molar        Multiple        Live Births   2            Her kids are 7 and 8yo. GYN History     Patient's last menstrual period was 2023 (approximate). Cycle Length 28 Lasting 3-7  trace positive dysmenorrhea; negative postcoital bleeding    Past Medical History:   Diagnosis Date    Anxiety     History of     Asthma     Has been numerous years since last use of inhalers     Benign mole 2019    Pre-Cancerous x 2 on Abdomen    Family history of breast cancer 2018    Pt saw [facility]. Had genetic testing and this was pending. Her lifetime risk based on family hx was 20% for breast cancer. Candidate for annual breast mammogram and MRI.       GDM (gestational diabetes mellitus)     with 1st pregnancy    Hashimoto's disease     Hypertension     History of, no meds     Miscarriage     Vitamin D deficiency      Past Surgical History:   Procedure Laterality Date    CHOLECYSTECTOMY, LAPAROSCOPIC  2009    ORTHOPEDIC SURGERY      Right foot and toes surgery    ROTATOR CUFF REPAIR      left shoulder    TUBAL LIGATION      Essure coil    US BREAST BIOPSY W LOC

## 2023-06-26 ENCOUNTER — OFFICE VISIT (OUTPATIENT)
Dept: OBGYN CLINIC | Age: 48
End: 2023-06-26
Payer: COMMERCIAL

## 2023-06-26 VITALS — HEIGHT: 64 IN | BODY MASS INDEX: 33.63 KG/M2 | WEIGHT: 197 LBS

## 2023-06-26 DIAGNOSIS — Z13.89 SCREENING FOR GENITOURINARY CONDITION: Primary | ICD-10-CM

## 2023-06-26 DIAGNOSIS — N95.1 PERIMENOPAUSAL SYMPTOMS: ICD-10-CM

## 2023-06-26 LAB
BILIRUBIN, URINE, POC: NEGATIVE
BLOOD URINE, POC: NORMAL
GLUCOSE URINE, POC: NEGATIVE
KETONES, URINE, POC: NEGATIVE
LEUKOCYTE ESTERASE, URINE, POC: NEGATIVE
NITRITE, URINE, POC: NEGATIVE
PH, URINE, POC: 6.5 (ref 4.6–8)
PROTEIN,URINE, POC: NEGATIVE
SPECIFIC GRAVITY, URINE, POC: 1 (ref 1–1.03)
URINALYSIS CLARITY, POC: CLEAR
URINALYSIS COLOR, POC: YELLOW
UROBILINOGEN, POC: NORMAL

## 2023-06-26 PROCEDURE — 81002 URINALYSIS NONAUTO W/O SCOPE: CPT | Performed by: OBSTETRICS & GYNECOLOGY

## 2023-06-26 PROCEDURE — 99214 OFFICE O/P EST MOD 30 MIN: CPT | Performed by: OBSTETRICS & GYNECOLOGY

## 2023-06-28 LAB
ESTRADIOL SERPL-MCNC: 58.56 PG/ML
FSH SERPL-ACNC: 10 MIU/ML
LH SERPL-ACNC: 3.6 MIU/ML
PROGEST SERPL-MCNC: <0.21 NG/ML

## 2023-07-23 ENCOUNTER — HOSPITAL ENCOUNTER (EMERGENCY)
Age: 48
Discharge: HOME | End: 2023-07-23
Attending: EMERGENCY MEDICINE
Payer: COMMERCIAL

## 2023-07-23 VITALS
DIASTOLIC BLOOD PRESSURE: 103 MMHG | RESPIRATION RATE: 16 BRPM | HEIGHT: 64 IN | SYSTOLIC BLOOD PRESSURE: 157 MMHG | TEMPERATURE: 98.6 F | WEIGHT: 190 LBS | OXYGEN SATURATION: 99 % | HEART RATE: 82 BPM | BODY MASS INDEX: 32.44 KG/M2

## 2023-07-23 DIAGNOSIS — M79.605 LEFT LEG PAIN: Primary | ICD-10-CM

## 2023-07-23 PROCEDURE — 99282 EMERGENCY DEPT VISIT SF MDM: CPT

## 2023-07-23 ASSESSMENT — PAIN - FUNCTIONAL ASSESSMENT: PAIN_FUNCTIONAL_ASSESSMENT: 0-10

## 2023-07-23 ASSESSMENT — LIFESTYLE VARIABLES
HOW MANY STANDARD DRINKS CONTAINING ALCOHOL DO YOU HAVE ON A TYPICAL DAY: PATIENT DOES NOT DRINK
HOW OFTEN DO YOU HAVE A DRINK CONTAINING ALCOHOL: NEVER

## 2023-07-23 ASSESSMENT — PAIN SCALES - GENERAL: PAINLEVEL_OUTOF10: 7

## 2023-07-23 NOTE — DISCHARGE INSTRUCTIONS
As we discussed, ice your front of your leg 20 minutes at a time, 4 times a day, keep your leg elevated. You can wrap it and gentle compression with an Ace wrap. Return to the emergency department as needed for worsening of symptoms.   Otherwise follow-up with primary care doctor for reevaluation to ensure you are improving

## 2023-07-23 NOTE — ED PROVIDER NOTES
Emergency Department Provider Note       PCP: EYAD Moseley   Age: 41 y.o. Sex: female     DISPOSITION Decision To Discharge 07/23/2023 06:30:30 PM       ICD-10-CM    1. Left leg pain  M79.605           Medical Decision Making     Complexity of Problems Addressed:  Complexity of Problem: 1 acute, uncomplicated illness or injury. Data Reviewed and Analyzed:  Category 1:   I independently ordered and reviewed each unique test.         Category 2:       Category 3: Discussion of management or test interpretation. Patient symptoms do not appear consistent with DVT, no evidence of cellulitis, no evidence of joint injury, muscles are intact, range of motion is normal.  I suspect this most likely represents a small blood vessel rupture resulting in some swelling that is now dissipating. I recommended conservative management with ice, light compression, elevation. Follow-up with primary care as needed. She is comfortable this plan understands reasons to return. Risk of Complications and/or Morbidity of Patient Management:      History     Natalie Selby is a 41 y.o. female who presents to the Emergency Department with chief complaint of    Chief Complaint   Patient presents with    Leg Pain      80-year-old female presenting to the emergency department complaint of pain in her left anterior shin. She states that she was cleaning up from a camping trip and was walking when she felt a popping sensation in her anterior shin and then immediately had what looked like a varicose vein began to appear and then had swelling around it. She states it is painful to touch. However the swelling is already starting to go down and spread out circumferentially from around the area where the initial swelling was. There is no bruising noted. There is no injury, no traumatic injury no falls. No pain in her calf and her thigh, no chest pain or shortness of breath.   No history of DVT, no recent fractures or

## 2023-07-23 NOTE — ED TRIAGE NOTES
Pt ambulatory to triage. Pt c/o left leg pain and swelling. States she just came home from camping today and when she got home she \"felt a pop in her shin and looks like a vein popped\" and now has swelling and pain to the area. States she went to [facility] and sent her here for ultrasound. No hx blood clots. Denies any falls while camping.

## 2023-07-26 ENCOUNTER — APPOINTMENT (OUTPATIENT)
Dept: MAMMOGRAPHY | Age: 48
End: 2023-07-26
Attending: OBSTETRICS & GYNECOLOGY
Payer: COMMERCIAL

## 2023-07-26 ENCOUNTER — HOSPITAL ENCOUNTER (OUTPATIENT)
Dept: MAMMOGRAPHY | Age: 48
Discharge: HOME | End: 2023-07-29
Attending: OBSTETRICS & GYNECOLOGY
Payer: COMMERCIAL

## 2023-07-26 ENCOUNTER — HOSPITAL ENCOUNTER (OUTPATIENT)
Dept: MAMMOGRAPHY | Age: 48
Discharge: HOME | End: 2023-07-29
Payer: COMMERCIAL

## 2023-07-26 DIAGNOSIS — N63.20 MASS OF LEFT BREAST, UNSPECIFIED QUADRANT: ICD-10-CM

## 2023-07-26 DIAGNOSIS — N63.10 MASS OF RIGHT BREAST, UNSPECIFIED QUADRANT: ICD-10-CM

## 2023-07-26 DIAGNOSIS — N63.20 MASSES OF BOTH BREASTS: ICD-10-CM

## 2023-07-26 DIAGNOSIS — N63.10 MASSES OF BOTH BREASTS: ICD-10-CM

## 2023-07-26 PROCEDURE — 77066 DX MAMMO INCL CAD BI: CPT

## 2023-07-26 PROCEDURE — 76642 ULTRASOUND BREAST LIMITED: CPT

## 2023-07-26 PROCEDURE — G0279 TOMOSYNTHESIS, MAMMO: HCPCS

## 2023-08-28 ENCOUNTER — TELEPHONE (OUTPATIENT)
Dept: ENDOCRINOLOGY | Age: 48
End: 2023-08-28

## 2023-08-28 NOTE — TELEPHONE ENCOUNTER
Patient called asking if she can do labs here in the building. I called her back and spoke to her letting her know she can do labs here. She expressed understanding.

## 2023-09-01 DIAGNOSIS — E06.3 HASHIMOTO'S THYROIDITIS: ICD-10-CM

## 2023-09-01 LAB — TSH W FREE THYROID IF ABNORMAL: 1.35 UIU/ML (ref 0.36–3.74)

## 2023-09-05 ENCOUNTER — OFFICE VISIT (OUTPATIENT)
Dept: ENDOCRINOLOGY | Age: 48
End: 2023-09-05
Payer: COMMERCIAL

## 2023-09-05 VITALS
WEIGHT: 195 LBS | HEART RATE: 86 BPM | SYSTOLIC BLOOD PRESSURE: 124 MMHG | DIASTOLIC BLOOD PRESSURE: 84 MMHG | BODY MASS INDEX: 33.47 KG/M2 | OXYGEN SATURATION: 99 %

## 2023-09-05 DIAGNOSIS — E04.1 THYROID NODULE: Primary | ICD-10-CM

## 2023-09-05 DIAGNOSIS — E06.3 HASHIMOTO'S THYROIDITIS: ICD-10-CM

## 2023-09-05 PROCEDURE — 99214 OFFICE O/P EST MOD 30 MIN: CPT | Performed by: INTERNAL MEDICINE

## 2023-09-05 PROCEDURE — 76536 US EXAM OF HEAD AND NECK: CPT | Performed by: INTERNAL MEDICINE

## 2023-09-05 RX ORDER — ASCORBIC ACID 125 MG
TABLET,CHEWABLE ORAL
COMMUNITY

## 2023-09-05 RX ORDER — CALCIUM CARBONATE/VITAMIN D3 500 MG-10
TABLET ORAL
COMMUNITY
Start: 2022-09-02

## 2023-09-05 ASSESSMENT — ENCOUNTER SYMPTOMS
DIARRHEA: 0
CONSTIPATION: 1

## 2023-09-05 NOTE — PROGRESS NOTES
Konrad Mckeon MD, [facility]  [address] [address], [address]  Phone [phone]  Facsimile [phone]          Marisa Miller is a 41 y.o. female seen 9/5/2023 for follow-up of thyroid nodule and Hashimoto's thyroiditis        ASSESSMENT AND PLAN:    1. Thyroid nodule  Thyroid ultrasound performed today revealed that the superior right lobe nodule versus pseudonodule was stable to slightly smaller in size. I will have her return in 1 year for a follow-up ultrasound to document stability. 2. Hashimoto's thyroiditis  Based on her positive family history of thyroid disease, thyroid ultrasound appearance and positive thyroid peroxidase and thyroglobulin antibodies, she has Hashimoto's thyroiditis. She is currently biochemically euthyroid. We have discussed that Hashimoto's thyroiditis confers an increased risk for the development of hypothyroidism and I recommend monitoring her thyroid function tests once a year. Procedures:    Thyroid ultrasound 9/5/2023: Right lobe 1.96 x 1.98 x 4.98 cm, heterogeneous echotexture. There are multiple hypoechoic areas likely representing pseudonodules. In the superior right lobe there is a hypoechoic nodule versus pseudonodule measuring 0.58 x 0.70 x 1.24 cm without microcalcifications (TR 4). Isthmus measures 0.40 cm. Left lobe 1.78 x 1.84 x 5.00 cm, heterogeneous echotexture. There are multiple hypoechoic areas likely representing pseudo nodules. Follow-up and Dispositions    Return in about 1 year (around 9/5/2024). HISTORY OF PRESENT ILLNESS:    THYROID NODULE / MULTINODULAR GOITER     Presentation: She has a history of postpartum thyroiditis in 2016. She was noted to have a thyroid nodule in 8/2021 during the evaluation of Hashimoto's thyroiditis. Thyroid Cancer Risk Factors: There is no family history of thyroid cancer.   There is no history of

## 2024-05-21 SDOH — ECONOMIC STABILITY: FOOD INSECURITY: WITHIN THE PAST 12 MONTHS, THE FOOD YOU BOUGHT JUST DIDN'T LAST AND YOU DIDN'T HAVE MONEY TO GET MORE.: NEVER TRUE

## 2024-05-21 SDOH — ECONOMIC STABILITY: FOOD INSECURITY: WITHIN THE PAST 12 MONTHS, YOU WORRIED THAT YOUR FOOD WOULD RUN OUT BEFORE YOU GOT MONEY TO BUY MORE.: NEVER TRUE

## 2024-05-21 SDOH — ECONOMIC STABILITY: INCOME INSECURITY: HOW HARD IS IT FOR YOU TO PAY FOR THE VERY BASICS LIKE FOOD, HOUSING, MEDICAL CARE, AND HEATING?: NOT HARD AT ALL

## 2024-05-22 NOTE — PROGRESS NOTES
Future  -     Estradiol; Future  -     Progesterone; Future  -     Progesterone  -     Estradiol  -     Luteinizing Hormone  -     Follicle Stimulating Hormone    Well woman exam  -     AMB POC URINALYSIS DIP STICK MANUAL W/O MICRO  -     PAP IG, HPV Rfx HPV 16/18,45; Future  -     PAP IG, HPV Rfx HPV 16/18,45    Screening for genitourinary condition  -     AMB POC URINALYSIS DIP STICK MANUAL W/O MICRO    Screening for human papillomavirus (HPV)  -     PAP IG, HPV Rfx HPV 16/18,45; Future  -     PAP IG, HPV Rfx HPV 16/18,45    Screening for cervical cancer  -     PAP IG, HPV Rfx HPV 16/18,45; Future  -     PAP IG, HPV Rfx HPV 16/18,45     Reviewed us that she had for workup in hospital.  Calcifications seen on ovary.      Return in about 1 year (around 5/24/2025) for annual exam.   Will check hormones.  Will order 3d mammogram.      Lynn Fernández D.O

## 2024-05-24 ENCOUNTER — OFFICE VISIT (OUTPATIENT)
Dept: OBGYN CLINIC | Age: 49
End: 2024-05-24
Payer: COMMERCIAL

## 2024-05-24 VITALS
WEIGHT: 180 LBS | BODY MASS INDEX: 30.73 KG/M2 | HEIGHT: 64 IN | SYSTOLIC BLOOD PRESSURE: 124 MMHG | DIASTOLIC BLOOD PRESSURE: 88 MMHG

## 2024-05-24 DIAGNOSIS — Z11.51 SCREENING FOR HUMAN PAPILLOMAVIRUS (HPV): ICD-10-CM

## 2024-05-24 DIAGNOSIS — Z13.89 SCREENING FOR GENITOURINARY CONDITION: ICD-10-CM

## 2024-05-24 DIAGNOSIS — N91.3 PRIMARY OLIGOMENORRHEA: Primary | ICD-10-CM

## 2024-05-24 DIAGNOSIS — Z12.4 SCREENING FOR CERVICAL CANCER: ICD-10-CM

## 2024-05-24 DIAGNOSIS — Z01.419 WELL WOMAN EXAM: ICD-10-CM

## 2024-05-24 LAB
BILIRUBIN, URINE, POC: NEGATIVE
BLOOD URINE, POC: NORMAL
ESTRADIOL SERPL-MCNC: 64.1 PG/ML
FSH SERPL-ACNC: 7.8 MIU/ML
GLUCOSE URINE, POC: NEGATIVE
KETONES, URINE, POC: NEGATIVE
LEUKOCYTE ESTERASE, URINE, POC: NORMAL
LH SERPL-ACNC: 4.5 MIU/ML
NITRITE, URINE, POC: NEGATIVE
PH, URINE, POC: 5.5 (ref 4.6–8)
PROGEST SERPL-MCNC: 3.31 NG/ML
PROTEIN,URINE, POC: NEGATIVE
SPECIFIC GRAVITY, URINE, POC: 1.01 (ref 1–1.03)
URINALYSIS CLARITY, POC: CLEAR
URINALYSIS COLOR, POC: YELLOW
UROBILINOGEN, POC: NORMAL

## 2024-05-24 PROCEDURE — 81002 URINALYSIS NONAUTO W/O SCOPE: CPT | Performed by: OBSTETRICS & GYNECOLOGY

## 2024-05-24 PROCEDURE — 99396 PREV VISIT EST AGE 40-64: CPT | Performed by: OBSTETRICS & GYNECOLOGY

## 2024-06-06 LAB
COLLECTION METHOD: NORMAL
CYTOLOGIST CVX/VAG CYTO: NORMAL
CYTOLOGY CVX/VAG DOC THIN PREP: NORMAL
DATE OF LMP: NORMAL
HPV APTIMA: NEGATIVE
Lab: NORMAL
Lab: NORMAL
PAP SOURCE: NORMAL
PATH REPORT.FINAL DX SPEC: NORMAL
PREV CYTO INFO: NEGATIVE
PREV TREATMENT: NORMAL
STAT OF ADQ CVX/VAG CYTO-IMP: NORMAL

## 2024-06-25 NOTE — PROGRESS NOTES
Heydi  is a 48 y.o. female, .  Patient's last menstrual period was 2024 (exact date)., who is being seen for gyn ultrasound performed secondary to menorrhagia with irregular cycle. Patient is currently using Essure for birth control.  Hx of cardiac ablation and seeing cards in July.  Also hx of ab pain and has pancreatic stent in place.     Ultrasound finding from today:  UT- anteverted and heterogeneous, c/w mild diffuse adenomyosis, essure visualized and appears WNL   Fibroid seen:   F1- rt fernanda ped 3.4 x 2.8 x 3.2 cm (prev 2.5 x 3.1 x 3.0 cm)   ENDO- 8.5 mm, echogenic area with feeder vessel, probable polyp measuring 1.0 x 0.4 x 0.8 cm   ROV- #1- probable hemorrhagic clc 1.4 x 1.2 x 1.2 cm and #2- clc 1.0 x 1.0 x 1.0 cm   LOV- 1.2 cm dominent follicle visualized and appears WNL   Bilateral adnexas appear WNL   No free fluid present   Uterine volume=137.288 cm³     Discussed us with pt.  Fibroid essentially stable. Maybe polyp.  Right ovary with clc cyst and no free fluid.    Offered low dose ocps, daily progestin, partial hysterectomy.  She is considering partial hysterectomy but has had a medically complex time.        HISTORY:    OB History          3    Para   2    Term   2            AB   1    Living   2         SAB   1    IAB        Ectopic        Molar        Multiple        Live Births   2              GYN History         Sexual History:   Social History     Substance and Sexual Activity   Sexual Activity Yes    Partners: Male    Birth control/protection: Surgical    Comment: YOVANI          has a past medical history of Anxiety, Asthma, Autoimmune disorder (HCC), Benign mole, Breast disorder, Cancer (HCC), Family history of breast cancer, GDM (gestational diabetes mellitus), Gestational diabetes mellitus, Hashimoto's disease, Heart defect, Hypertension, Liver disease, Menopausal symptoms, Miscarriage, and Vitamin D deficiency. .    Past Surgical History:   Procedure Laterality

## 2024-06-27 ENCOUNTER — OFFICE VISIT (OUTPATIENT)
Dept: OBGYN CLINIC | Age: 49
End: 2024-06-27
Payer: COMMERCIAL

## 2024-06-27 ENCOUNTER — PROCEDURE VISIT (OUTPATIENT)
Dept: OBGYN CLINIC | Age: 49
End: 2024-06-27
Payer: COMMERCIAL

## 2024-06-27 VITALS
SYSTOLIC BLOOD PRESSURE: 130 MMHG | WEIGHT: 182 LBS | HEIGHT: 64 IN | BODY MASS INDEX: 31.07 KG/M2 | DIASTOLIC BLOOD PRESSURE: 82 MMHG

## 2024-06-27 DIAGNOSIS — R10.31 CHRONIC RLQ PAIN: ICD-10-CM

## 2024-06-27 DIAGNOSIS — N92.1 MENORRHAGIA WITH IRREGULAR CYCLE: Primary | ICD-10-CM

## 2024-06-27 DIAGNOSIS — N92.6 IRREGULAR BLEEDING: Primary | ICD-10-CM

## 2024-06-27 DIAGNOSIS — G89.29 CHRONIC RLQ PAIN: ICD-10-CM

## 2024-06-27 PROCEDURE — 76830 TRANSVAGINAL US NON-OB: CPT | Performed by: OBSTETRICS & GYNECOLOGY

## 2024-06-27 PROCEDURE — 99214 OFFICE O/P EST MOD 30 MIN: CPT | Performed by: OBSTETRICS & GYNECOLOGY

## 2024-06-27 RX ORDER — PANTOPRAZOLE SODIUM 40 MG/1
40 TABLET, DELAYED RELEASE ORAL DAILY
COMMUNITY

## 2024-07-03 ENCOUNTER — TELEPHONE (OUTPATIENT)
Dept: OBGYN CLINIC | Age: 49
End: 2024-07-03

## 2024-07-03 NOTE — TELEPHONE ENCOUNTER
Spoke with pt about scheduling surgery with Dr. Fernández. Pt preferred to give me a call after she has seen her cardiologist to set up surgery date. Left my direct number for pt to contact me when she is ready to schedule.

## 2024-08-09 ENCOUNTER — HOSPITAL ENCOUNTER (OUTPATIENT)
Dept: MAMMOGRAPHY | Age: 49
Discharge: HOME | End: 2024-08-09
Attending: OBSTETRICS & GYNECOLOGY
Payer: COMMERCIAL

## 2024-08-09 DIAGNOSIS — Z12.31 VISIT FOR SCREENING MAMMOGRAM: ICD-10-CM

## 2024-08-09 PROCEDURE — 77063 BREAST TOMOSYNTHESIS BI: CPT

## 2024-08-27 ENCOUNTER — HOSPITAL ENCOUNTER (OUTPATIENT)
Dept: MAMMOGRAPHY | Age: 49
Discharge: HOME | End: 2024-08-30
Attending: OBSTETRICS & GYNECOLOGY
Payer: COMMERCIAL

## 2024-08-27 DIAGNOSIS — R92.8 ABNORMAL SCREENING MAMMOGRAM: ICD-10-CM

## 2024-08-27 PROCEDURE — G0279 TOMOSYNTHESIS, MAMMO: HCPCS

## 2024-08-28 DIAGNOSIS — Z80.3 FAMILY HISTORY OF BREAST CANCER: ICD-10-CM

## 2024-08-28 DIAGNOSIS — R92.333 HETEROGENEOUSLY DENSE TISSUE OF BOTH BREASTS ON MAMMOGRAPHY: Primary | ICD-10-CM

## 2024-08-28 NOTE — PROGRESS NOTES
Orders Placed This Encounter   Procedures    MRI BREAST BILATERAL W WO CONTRAST     Standing Status:   Future     Standing Expiration Date:   8/28/2025     Order Specific Question:   STAT Creatinine as needed:     Answer:   No

## 2024-08-30 DIAGNOSIS — E06.3 HASHIMOTO'S THYROIDITIS: ICD-10-CM

## 2024-08-30 LAB — TSH W FREE THYROID IF ABNORMAL: 1.08 UIU/ML (ref 0.27–4.2)

## 2024-09-05 ENCOUNTER — OFFICE VISIT (OUTPATIENT)
Dept: ENDOCRINOLOGY | Age: 49
End: 2024-09-05
Payer: COMMERCIAL

## 2024-09-05 VITALS
HEART RATE: 84 BPM | DIASTOLIC BLOOD PRESSURE: 84 MMHG | SYSTOLIC BLOOD PRESSURE: 128 MMHG | BODY MASS INDEX: 31.93 KG/M2 | WEIGHT: 186 LBS

## 2024-09-05 DIAGNOSIS — E04.1 THYROID NODULE: Primary | ICD-10-CM

## 2024-09-05 DIAGNOSIS — E06.3 HASHIMOTO'S THYROIDITIS: ICD-10-CM

## 2024-09-05 PROCEDURE — 76536 US EXAM OF HEAD AND NECK: CPT | Performed by: INTERNAL MEDICINE

## 2024-09-05 PROCEDURE — 99214 OFFICE O/P EST MOD 30 MIN: CPT | Performed by: INTERNAL MEDICINE

## 2024-09-05 ASSESSMENT — ENCOUNTER SYMPTOMS
CONSTIPATION: 1
DIARRHEA: 0

## 2024-09-05 NOTE — PROGRESS NOTES
Konrad Prescott MD, [facility]  [address] [place] [address], [address]  [place] [address]  Phone [phone]  Facsimile [phone]          Heydi Medina is a 48 y.o. female seen 9/5/2024 for follow-up of thyroid nodule and Hashimoto's thyroiditis        ASSESSMENT AND PLAN:    1. Thyroid nodule  Thyroid ultrasound performed today revealed that the superior right lobe nodule versus pseudonodule was stable in size.  I will consider repeating a thyroid ultrasound in 2 to 3 years to document stability.    2. Hashimoto's thyroiditis  Based on her positive family history of thyroid disease, thyroid ultrasound appearance and positive thyroid peroxidase and thyroglobulin antibodies, she has Hashimoto's thyroiditis.  She is currently biochemically euthyroid.  We have discussed that Hashimoto's thyroiditis confers an increased risk for the development of hypothyroidism and I recommend monitoring her thyroid function tests once a year.                 Procedures:    Thyroid ultrasound 9/5/2024: Right lobe 1.74 x 1.87 x 4.80 cm, heterogeneous echotexture.  There are multiple hypoechoic areas likely representing pseudonodules.  In the superior right lobe there is a hypoechoic nodule versus pseudonodule measuring 0.58 x 0.76 x 1.38 cm without microcalcifications (TR 4).  Isthmus measures 0.35 cm.  Left lobe 1.81 x 1.65 x 4.99 cm, heterogeneous echotexture.  There are multiple hypoechoic areas likely representing pseudo nodules.  In the inferior left lobe medially there is a complex isoechoic nodule measuring 0.40 x 0.47 x 0.49 cm (TR 2).      Follow-up and Dispositions    Return in about 1 year (around 9/5/2025).         HISTORY OF PRESENT ILLNESS:    THYROID NODULE / MULTINODULAR GOITER     Presentation: She has a history of postpartum thyroiditis in 2016.  She was noted to have a thyroid nodule in 8/2021 during the evaluation of Hashimoto's thyroiditis.     Thyroid

## 2024-09-17 ENCOUNTER — TELEPHONE (OUTPATIENT)
Dept: OBGYN CLINIC | Age: 49
End: 2024-09-17

## 2024-09-17 PROBLEM — N93.9 ABNORMAL UTERINE BLEEDING (AUB): Status: ACTIVE | Noted: 2024-10-22

## 2024-09-17 PROBLEM — N80.03 ADENOMYOSIS: Status: ACTIVE | Noted: 2024-10-22

## 2024-09-17 PROBLEM — D21.9 FIBROIDS: Status: ACTIVE | Noted: 2024-10-22

## 2024-09-24 NOTE — PROGRESS NOTES
Enhanced Recovery After GYN Surgery: non-diabetic patients    It is highly recommended you purchase and drink Ensure Complete - one bottle twice daily for five days starting on 10/16/24. Ensure Complete is the preferred formula over other Ensure formulas. It is recommended that you continue drinking this for one month after surgery.    The night before surgery 10/21/24, drink 2 bottles of the Ensure Pre-Surgery drink.     The morning of surgery 10/22/24, drink one bottle of the Ensure Pre-Surgery drink 2 hours prior to your arrival to the hospital. Drink this over 5-10 minutes.    Drink nothing else after drinking the pre-surgical drink the morning of surgery.    Bring your patient handbook with you to the hospital.    Things to remember:    1. You will be given clear liquids to drink, advancing diet as tolerated    2. You will be up and moving around with assistance 2-4 hours after surgery.    3. You will be given regularly scheduled pain medications (NSAIDS, Tylenol) with narcotics as needed.    4. You may be able to go home that night if the surgeon okays and you are up and eating and drinking. Otherwise, your discharge will be the following morning around lunch time.     5. Continue drinking Ensure Complete for 5 days after surgery.    Detail Level: Zone Photo Preface (Leave Blank If You Do Not Want): Photographs were obtained today

## 2024-10-02 ENCOUNTER — HOSPITAL ENCOUNTER (OUTPATIENT)
Dept: SURGERY | Age: 49
Discharge: HOME | End: 2024-10-05
Payer: COMMERCIAL

## 2024-10-02 DIAGNOSIS — Z01.818 PRE-OP TESTING: ICD-10-CM

## 2024-10-02 LAB — HGB BLD-MCNC: 13 G/DL (ref 11.7–15.4)

## 2024-10-02 PROCEDURE — 85018 HEMOGLOBIN: CPT

## 2024-10-02 PROCEDURE — 36415 COLL VENOUS BLD VENIPUNCTURE: CPT

## 2024-10-02 NOTE — NURSE NAVIGATOR
Patient attended ERAS/ GYN surgery orientation class today.  Detailed instruction book regarding GYN surgery was provided at start of class. Class content included pre-operative instructions for surgery in the week prior to and day before surgery. Packet including Hibiclens and printed instructions on bathing was provided to patient. Detailed and printed diet instruction and presurgical drinks were also given to patient  in accordance with ERAS protocol. Detailed information was given regarding arriving at the hospital and instructions for the patient's day of surgery.  Discussed recovery from surgery, hospital stay, pain management, and discharge.  Reviewed recovery at home including pelvic rest, driving and activity restrictions, issues requiring call to physician etc. Answered all questions in detail.  Patient voices understanding of all.

## 2024-10-04 NOTE — PERIOP NOTE
Patient verified name and     Order for consent not found in EHR.     Type 2 surgery    Labs per surgeon: orders not received.  Labs per anesthesia protocol: Hgb done at GYN class and within anesthesia guidelines. Type and Screen DOS.   EKG: Found in care everywhere dated 24 and within anesthesia guidelines.     Last cardiology office note dated 24, last GI office note dated 24 and last Echo dated 4/10/24 found in care everywhere if needed for anesthesia reference.    Patient attended GYN class on 10/2/24 at which time labs were drawn. Patient will also received all patient education and if staying overnight received hospital approved surgical skin cleanser; if not, patient will use non-moisturizing soap.    Patient instructed to hold all vitamins 7 days prior to surgery and NSAIDS 5 days prior to surgery, patient verbalized understanding.    Patient instructed to continue previous medications as prescribed prior to surgery and to take the following medications the day of surgery according to anesthesia guidelines with a small sip of water: protonix and bring albuterol inhaler..     Patient answered medical/surgical history questions at their best of ability. All prior to admission medications documented in [facility].

## 2024-10-10 NOTE — PROGRESS NOTES
History and Physical      Heydi Medina:   Physician:  Lynn Fernández, DO    This 48 y.o. female presents today for pre-operative evaluation.    History:  This 48 y.o.  patient has history of abnormal uterine bleeding, fibroids and adenomyosis. Previous treatment includes: Essure for birth control.  She had cards follow up visit this summer.  Her pancreatic stent was removed.  She has a lifetime incidence of breast cancer 44%.      OB History          3    Para   2    Term   2            AB   1    Living   2         SAB   1    IAB        Ectopic        Molar        Multiple        Live Births   2                Past Medical History:   Diagnosis Date    Anxiety     History of     Asthma     Has been numerous years since last use of inhalers     Atrial fibrillation (HCC)     followed by [facility]    Autoimmune disorder (HCC)     Hashimotos    Benign mole 2019    Pre-Cancerous x 2 on Abdomen    Breast disorder     Cancer (HCC) -    Pre cancerous on stomach    Family history of breast cancer 2018    Pt saw [facility].  Had genetic testing and this was pending.  Her lifetime risk based on family hx was 20% for breast cancer.  Candidate for annual breast mammogram and MRI.      GDM (gestational diabetes mellitus)     with 1st pregnancy    Gestational diabetes mellitus 2013    Hashimoto's disease     Heart defect     Heart ablation done     Hepatitis 2024    Recent liver bx which showed resolving hepatitis likely 2/2 DILI    Hypertension     History of, no meds     Liver disease     Liver enzymes thru the roof, back to normal currently, liver biopsy done which showed resolving hepatitis, followed by GI    Menopausal symptoms     Headaches, abdominal pains    Miscarriage 2015    Transaminitis     Vitamin D deficiency         has a past surgical history that includes Tubal ligation; [place] tooth extraction ();

## 2024-10-11 ENCOUNTER — OFFICE VISIT (OUTPATIENT)
Dept: OBGYN CLINIC | Age: 49
End: 2024-10-11

## 2024-10-11 VITALS
DIASTOLIC BLOOD PRESSURE: 76 MMHG | SYSTOLIC BLOOD PRESSURE: 130 MMHG | WEIGHT: 176.3 LBS | HEIGHT: 64 IN | BODY MASS INDEX: 30.1 KG/M2

## 2024-10-11 DIAGNOSIS — Z01.818 PREOP EXAMINATION: Primary | ICD-10-CM

## 2024-10-11 PROCEDURE — 99024 POSTOP FOLLOW-UP VISIT: CPT | Performed by: OBSTETRICS & GYNECOLOGY

## 2024-10-11 RX ORDER — SODIUM CHLORIDE 0.9 % (FLUSH) 0.9 %
5-40 SYRINGE (ML) INJECTION EVERY 12 HOURS SCHEDULED
Status: CANCELLED | OUTPATIENT
Start: 2024-10-11

## 2024-10-11 RX ORDER — SODIUM CHLORIDE 0.9 % (FLUSH) 0.9 %
5-40 SYRINGE (ML) INJECTION PRN
Status: CANCELLED | OUTPATIENT
Start: 2024-10-11

## 2024-10-11 NOTE — H&P
History and Physical      Heydi Medina:   Physician:  Lynn Fernández, DO    This 48 y.o. female presents today for pre-operative evaluation.    History:  This 48 y.o.  patient has history of abnormal uterine bleeding, fibroids and adenomyosis. Previous treatment includes: Essure for birth control.  She had cards follow up visit this summer.  Her pancreatic stent was removed.  She has a lifetime incidence of breast cancer 44%.      OB History          3    Para   2    Term   2            AB   1    Living   2         SAB   1    IAB        Ectopic        Molar        Multiple        Live Births   2                Past Medical History:   Diagnosis Date    Anxiety     History of     Asthma     Has been numerous years since last use of inhalers     Atrial fibrillation (HCC)     followed by [facility]    Autoimmune disorder (HCC)     Hashimotos    Benign mole 2019    Pre-Cancerous x 2 on Abdomen    Breast disorder     Cancer (HCC) -    Pre cancerous on stomach    Family history of breast cancer 2018    Pt saw [facility].  Had genetic testing and this was pending.  Her lifetime risk based on family hx was 20% for breast cancer.  Candidate for annual breast mammogram and MRI.      GDM (gestational diabetes mellitus)     with 1st pregnancy    Gestational diabetes mellitus 2013    Hashimoto's disease     Heart defect     Heart ablation done     Hepatitis 2024    Recent liver bx which showed resolving hepatitis likely 2/2 DILI    Hypertension     History of, no meds     Liver disease     Liver enzymes thru the roof, back to normal currently, liver biopsy done which showed resolving hepatitis, followed by GI    Menopausal symptoms     Headaches, abdominal pains    Miscarriage 2015    Transaminitis     Vitamin D deficiency         has a past surgical history that includes Tubal ligation; [place] tooth extraction ();

## 2024-10-21 RX ORDER — SODIUM CHLORIDE 0.9 % (FLUSH) 0.9 %
5-40 SYRINGE (ML) INJECTION PRN
Status: CANCELLED | OUTPATIENT
Start: 2024-10-21

## 2024-10-21 RX ORDER — SODIUM CHLORIDE 0.9 % (FLUSH) 0.9 %
5-40 SYRINGE (ML) INJECTION EVERY 12 HOURS SCHEDULED
Status: CANCELLED | OUTPATIENT
Start: 2024-10-21

## 2024-10-21 RX ORDER — SODIUM CHLORIDE 9 MG/ML
INJECTION, SOLUTION INTRAVENOUS PRN
Status: CANCELLED | OUTPATIENT
Start: 2024-10-21

## 2024-10-21 RX ORDER — SODIUM CHLORIDE, SODIUM LACTATE, POTASSIUM CHLORIDE, CALCIUM CHLORIDE 600; 310; 30; 20 MG/100ML; MG/100ML; MG/100ML; MG/100ML
INJECTION, SOLUTION INTRAVENOUS CONTINUOUS
Status: CANCELLED | OUTPATIENT
Start: 2024-10-21

## 2024-10-22 ENCOUNTER — ANESTHESIA EVENT (OUTPATIENT)
Dept: SURGERY | Age: 49
End: 2024-10-22
Payer: COMMERCIAL

## 2024-10-22 ENCOUNTER — ANESTHESIA (OUTPATIENT)
Dept: SURGERY | Age: 49
End: 2024-10-22
Payer: COMMERCIAL

## 2024-10-22 ENCOUNTER — PREP FOR PROCEDURE (OUTPATIENT)
Dept: OBGYN CLINIC | Age: 49
End: 2024-10-22

## 2024-10-22 ENCOUNTER — HOSPITAL ENCOUNTER (OUTPATIENT)
Age: 49
Setting detail: OBSERVATION
Discharge: HOME | End: 2024-10-22
Attending: OBSTETRICS & GYNECOLOGY | Admitting: OBSTETRICS & GYNECOLOGY
Payer: COMMERCIAL

## 2024-10-22 VITALS
HEIGHT: 64 IN | OXYGEN SATURATION: 98 % | RESPIRATION RATE: 12 BRPM | DIASTOLIC BLOOD PRESSURE: 84 MMHG | TEMPERATURE: 97.9 F | BODY MASS INDEX: 30.73 KG/M2 | SYSTOLIC BLOOD PRESSURE: 129 MMHG | WEIGHT: 180 LBS | HEART RATE: 85 BPM

## 2024-10-22 DIAGNOSIS — N80.03 ADENOMYOSIS: ICD-10-CM

## 2024-10-22 DIAGNOSIS — D21.9 FIBROIDS: ICD-10-CM

## 2024-10-22 DIAGNOSIS — N93.9 ABNORMAL UTERINE BLEEDING (AUB): ICD-10-CM

## 2024-10-22 DIAGNOSIS — Z01.818 PRE-OP TESTING: Primary | ICD-10-CM

## 2024-10-22 LAB
ABO + RH BLD: NORMAL
BLOOD GROUP ANTIBODIES SERPL: NORMAL
HCG UR QL: NEGATIVE
SPECIMEN EXP DATE BLD: NORMAL

## 2024-10-22 PROCEDURE — 58571 TLH W/T/O 250 G OR LESS: CPT | Performed by: OBSTETRICS & GYNECOLOGY

## 2024-10-22 PROCEDURE — 3700000001 HC ADD 15 MINUTES (ANESTHESIA): Performed by: OBSTETRICS & GYNECOLOGY

## 2024-10-22 PROCEDURE — 6370000000 HC RX 637 (ALT 250 FOR IP): Performed by: ANESTHESIOLOGY

## 2024-10-22 PROCEDURE — 6360000002 HC RX W HCPCS: Performed by: ANESTHESIOLOGY

## 2024-10-22 PROCEDURE — 7100000000 HC PACU RECOVERY - FIRST 15 MIN: Performed by: OBSTETRICS & GYNECOLOGY

## 2024-10-22 PROCEDURE — 2500000003 HC RX 250 WO HCPCS: Performed by: NURSE ANESTHETIST, CERTIFIED REGISTERED

## 2024-10-22 PROCEDURE — 2580000003 HC RX 258: Performed by: NURSE ANESTHETIST, CERTIFIED REGISTERED

## 2024-10-22 PROCEDURE — 86850 RBC ANTIBODY SCREEN: CPT

## 2024-10-22 PROCEDURE — 7100000011 HC PHASE II RECOVERY - ADDTL 15 MIN: Performed by: OBSTETRICS & GYNECOLOGY

## 2024-10-22 PROCEDURE — 2580000003 HC RX 258: Performed by: OBSTETRICS & GYNECOLOGY

## 2024-10-22 PROCEDURE — 7100000010 HC PHASE II RECOVERY - FIRST 15 MIN: Performed by: OBSTETRICS & GYNECOLOGY

## 2024-10-22 PROCEDURE — 2720000010 HC SURG SUPPLY STERILE: Performed by: OBSTETRICS & GYNECOLOGY

## 2024-10-22 PROCEDURE — 86901 BLOOD TYPING SEROLOGIC RH(D): CPT

## 2024-10-22 PROCEDURE — 88307 TISSUE EXAM BY PATHOLOGIST: CPT

## 2024-10-22 PROCEDURE — 7100000001 HC PACU RECOVERY - ADDTL 15 MIN: Performed by: OBSTETRICS & GYNECOLOGY

## 2024-10-22 PROCEDURE — 81025 URINE PREGNANCY TEST: CPT

## 2024-10-22 PROCEDURE — 86900 BLOOD TYPING SEROLOGIC ABO: CPT

## 2024-10-22 PROCEDURE — 2709999900 HC NON-CHARGEABLE SUPPLY: Performed by: OBSTETRICS & GYNECOLOGY

## 2024-10-22 PROCEDURE — 3600000014 HC SURGERY LEVEL 4 ADDTL 15MIN: Performed by: OBSTETRICS & GYNECOLOGY

## 2024-10-22 PROCEDURE — 3700000000 HC ANESTHESIA ATTENDED CARE: Performed by: OBSTETRICS & GYNECOLOGY

## 2024-10-22 PROCEDURE — 6360000002 HC RX W HCPCS: Performed by: OBSTETRICS & GYNECOLOGY

## 2024-10-22 PROCEDURE — 3600000004 HC SURGERY LEVEL 4 BASE: Performed by: OBSTETRICS & GYNECOLOGY

## 2024-10-22 PROCEDURE — 6360000002 HC RX W HCPCS: Performed by: NURSE ANESTHETIST, CERTIFIED REGISTERED

## 2024-10-22 RX ORDER — ONDANSETRON 2 MG/ML
INJECTION INTRAMUSCULAR; INTRAVENOUS
Status: DISCONTINUED | OUTPATIENT
Start: 2024-10-22 | End: 2024-10-22 | Stop reason: SDUPTHER

## 2024-10-22 RX ORDER — SODIUM CHLORIDE 9 MG/ML
INJECTION, SOLUTION INTRAVENOUS PRN
Status: DISCONTINUED | OUTPATIENT
Start: 2024-10-22 | End: 2024-10-22 | Stop reason: HOSPADM

## 2024-10-22 RX ORDER — GLYCOPYRROLATE 0.2 MG/ML
INJECTION INTRAMUSCULAR; INTRAVENOUS
Status: DISCONTINUED | OUTPATIENT
Start: 2024-10-22 | End: 2024-10-22 | Stop reason: SDUPTHER

## 2024-10-22 RX ORDER — ACETAMINOPHEN 500 MG
1000 TABLET ORAL ONCE
Status: COMPLETED | OUTPATIENT
Start: 2024-10-22 | End: 2024-10-22

## 2024-10-22 RX ORDER — MIDAZOLAM HYDROCHLORIDE 1 MG/ML
INJECTION, SOLUTION INTRAMUSCULAR; INTRAVENOUS
Status: DISCONTINUED | OUTPATIENT
Start: 2024-10-22 | End: 2024-10-22 | Stop reason: SDUPTHER

## 2024-10-22 RX ORDER — KETOROLAC TROMETHAMINE 30 MG/ML
INJECTION, SOLUTION INTRAMUSCULAR; INTRAVENOUS
Status: DISCONTINUED | OUTPATIENT
Start: 2024-10-22 | End: 2024-10-22 | Stop reason: SDUPTHER

## 2024-10-22 RX ORDER — NALOXONE HYDROCHLORIDE 0.4 MG/ML
INJECTION, SOLUTION INTRAMUSCULAR; INTRAVENOUS; SUBCUTANEOUS PRN
Status: DISCONTINUED | OUTPATIENT
Start: 2024-10-22 | End: 2024-10-22 | Stop reason: HOSPADM

## 2024-10-22 RX ORDER — OXYCODONE HYDROCHLORIDE 5 MG/1
5 TABLET ORAL
Status: DISCONTINUED | OUTPATIENT
Start: 2024-10-22 | End: 2024-10-22 | Stop reason: HOSPADM

## 2024-10-22 RX ORDER — IBUPROFEN 800 MG/1
800 TABLET, FILM COATED ORAL EVERY 8 HOURS PRN
Qty: 30 TABLET | Refills: 0 | Status: SHIPPED | OUTPATIENT
Start: 2024-10-22

## 2024-10-22 RX ORDER — DEXAMETHASONE SODIUM PHOSPHATE 10 MG/ML
INJECTION INTRAMUSCULAR; INTRAVENOUS
Status: DISCONTINUED | OUTPATIENT
Start: 2024-10-22 | End: 2024-10-22 | Stop reason: SDUPTHER

## 2024-10-22 RX ORDER — SODIUM CHLORIDE, SODIUM LACTATE, POTASSIUM CHLORIDE, CALCIUM CHLORIDE 600; 310; 30; 20 MG/100ML; MG/100ML; MG/100ML; MG/100ML
INJECTION, SOLUTION INTRAVENOUS
Status: DISCONTINUED | OUTPATIENT
Start: 2024-10-22 | End: 2024-10-22 | Stop reason: SDUPTHER

## 2024-10-22 RX ORDER — APREPITANT 40 MG/1
40 CAPSULE ORAL ONCE
Status: COMPLETED | OUTPATIENT
Start: 2024-10-22 | End: 2024-10-22

## 2024-10-22 RX ORDER — HYDROMORPHONE HYDROCHLORIDE 2 MG/ML
INJECTION, SOLUTION INTRAMUSCULAR; INTRAVENOUS; SUBCUTANEOUS
Status: DISCONTINUED | OUTPATIENT
Start: 2024-10-22 | End: 2024-10-22 | Stop reason: SDUPTHER

## 2024-10-22 RX ORDER — KETAMINE HCL IN NACL, ISO-OSM 20 MG/2 ML
SYRINGE (ML) INJECTION
Status: DISCONTINUED | OUTPATIENT
Start: 2024-10-22 | End: 2024-10-22 | Stop reason: SDUPTHER

## 2024-10-22 RX ORDER — LIDOCAINE HYDROCHLORIDE 20 MG/ML
INJECTION, SOLUTION EPIDURAL; INFILTRATION; INTRACAUDAL; PERINEURAL
Status: DISCONTINUED | OUTPATIENT
Start: 2024-10-22 | End: 2024-10-22 | Stop reason: SDUPTHER

## 2024-10-22 RX ORDER — LIDOCAINE HYDROCHLORIDE 10 MG/ML
1 INJECTION, SOLUTION INFILTRATION; PERINEURAL
Status: DISCONTINUED | OUTPATIENT
Start: 2024-10-22 | End: 2024-10-22 | Stop reason: HOSPADM

## 2024-10-22 RX ORDER — PROCHLORPERAZINE EDISYLATE 5 MG/ML
5 INJECTION INTRAMUSCULAR; INTRAVENOUS
Status: COMPLETED | OUTPATIENT
Start: 2024-10-22 | End: 2024-10-22

## 2024-10-22 RX ORDER — MIDAZOLAM HYDROCHLORIDE 2 MG/2ML
2 INJECTION, SOLUTION INTRAMUSCULAR; INTRAVENOUS
Status: DISCONTINUED | OUTPATIENT
Start: 2024-10-22 | End: 2024-10-22 | Stop reason: HOSPADM

## 2024-10-22 RX ORDER — ACETAMINOPHEN 500 MG
1000 TABLET ORAL EVERY 6 HOURS PRN
Qty: 30 TABLET | Refills: 0 | Status: SHIPPED | OUTPATIENT
Start: 2024-10-22

## 2024-10-22 RX ORDER — OXYCODONE HYDROCHLORIDE 5 MG/1
5 TABLET ORAL EVERY 4 HOURS PRN
Qty: 12 TABLET | Refills: 0 | Status: SHIPPED | OUTPATIENT
Start: 2024-10-22 | End: 2024-10-27

## 2024-10-22 RX ORDER — PROPOFOL 10 MG/ML
INJECTION, EMULSION INTRAVENOUS
Status: DISCONTINUED | OUTPATIENT
Start: 2024-10-22 | End: 2024-10-22 | Stop reason: SDUPTHER

## 2024-10-22 RX ORDER — ROCURONIUM BROMIDE 10 MG/ML
INJECTION, SOLUTION INTRAVENOUS
Status: DISCONTINUED | OUTPATIENT
Start: 2024-10-22 | End: 2024-10-22 | Stop reason: SDUPTHER

## 2024-10-22 RX ORDER — NEOSTIGMINE METHYLSULFATE 1 MG/ML
INJECTION INTRAVENOUS
Status: DISCONTINUED | OUTPATIENT
Start: 2024-10-22 | End: 2024-10-22 | Stop reason: SDUPTHER

## 2024-10-22 RX ORDER — FENTANYL CITRATE 50 UG/ML
INJECTION, SOLUTION INTRAMUSCULAR; INTRAVENOUS
Status: DISCONTINUED | OUTPATIENT
Start: 2024-10-22 | End: 2024-10-22 | Stop reason: SDUPTHER

## 2024-10-22 RX ORDER — ONDANSETRON 4 MG/1
4 TABLET, ORALLY DISINTEGRATING ORAL 3 TIMES DAILY PRN
Qty: 10 TABLET | Refills: 0 | Status: SHIPPED | OUTPATIENT
Start: 2024-10-22

## 2024-10-22 RX ADMIN — LIDOCAINE HYDROCHLORIDE 60 MG: 20 INJECTION, SOLUTION EPIDURAL; INFILTRATION; INTRACAUDAL; PERINEURAL at 09:40

## 2024-10-22 RX ADMIN — PROCHLORPERAZINE EDISYLATE 5 MG: 5 INJECTION INTRAMUSCULAR; INTRAVENOUS at 14:14

## 2024-10-22 RX ADMIN — MIDAZOLAM 2 MG: 1 INJECTION INTRAMUSCULAR; INTRAVENOUS at 09:30

## 2024-10-22 RX ADMIN — ROCURONIUM BROMIDE 50 MG: 10 INJECTION, SOLUTION INTRAVENOUS at 09:40

## 2024-10-22 RX ADMIN — FENTANYL CITRATE 50 MCG: 50 INJECTION, SOLUTION INTRAMUSCULAR; INTRAVENOUS at 09:40

## 2024-10-22 RX ADMIN — HYDROMORPHONE HYDROCHLORIDE 0.5 MG: 1 INJECTION, SOLUTION INTRAMUSCULAR; INTRAVENOUS; SUBCUTANEOUS at 11:52

## 2024-10-22 RX ADMIN — HYDROMORPHONE HYDROCHLORIDE 0.5 MG: 1 INJECTION, SOLUTION INTRAMUSCULAR; INTRAVENOUS; SUBCUTANEOUS at 12:09

## 2024-10-22 RX ADMIN — HYDROMORPHONE HYDROCHLORIDE 0.4 MG: 2 INJECTION INTRAMUSCULAR; INTRAVENOUS; SUBCUTANEOUS at 10:57

## 2024-10-22 RX ADMIN — PROPOFOL 200 MG: 10 INJECTION, EMULSION INTRAVENOUS at 09:40

## 2024-10-22 RX ADMIN — NEOSTIGMINE METHYLSULFATE 3 MG: 1 INJECTION, SOLUTION INTRAVENOUS at 11:12

## 2024-10-22 RX ADMIN — GLYCOPYRROLATE 0.4 MG: 0.2 INJECTION INTRAMUSCULAR; INTRAVENOUS at 11:12

## 2024-10-22 RX ADMIN — HYDROMORPHONE HYDROCHLORIDE 0.2 MG: 2 INJECTION INTRAMUSCULAR; INTRAVENOUS; SUBCUTANEOUS at 11:02

## 2024-10-22 RX ADMIN — ONDANSETRON 4 MG: 2 INJECTION, SOLUTION INTRAMUSCULAR; INTRAVENOUS at 11:03

## 2024-10-22 RX ADMIN — Medication 20 MG: at 09:40

## 2024-10-22 RX ADMIN — HYDROMORPHONE HYDROCHLORIDE 0.4 MG: 2 INJECTION INTRAMUSCULAR; INTRAVENOUS; SUBCUTANEOUS at 10:09

## 2024-10-22 RX ADMIN — CEFAZOLIN 2000 MG: 2 INJECTION, POWDER, FOR SOLUTION INTRAMUSCULAR; INTRAVENOUS at 09:33

## 2024-10-22 RX ADMIN — ACETAMINOPHEN 1000 MG: 500 TABLET, FILM COATED ORAL at 08:44

## 2024-10-22 RX ADMIN — APREPITANT 40 MG: 40 CAPSULE ORAL at 08:43

## 2024-10-22 RX ADMIN — SODIUM CHLORIDE, SODIUM LACTATE, POTASSIUM CHLORIDE, AND CALCIUM CHLORIDE: 600; 310; 30; 20 INJECTION, SOLUTION INTRAVENOUS at 09:27

## 2024-10-22 RX ADMIN — FENTANYL CITRATE 50 MCG: 50 INJECTION, SOLUTION INTRAMUSCULAR; INTRAVENOUS at 09:39

## 2024-10-22 RX ADMIN — DEXAMETHASONE SODIUM PHOSPHATE 8 MG: 10 INJECTION INTRAMUSCULAR; INTRAVENOUS at 10:05

## 2024-10-22 RX ADMIN — KETOROLAC TROMETHAMINE 30 MG: 30 INJECTION, SOLUTION INTRAMUSCULAR at 11:03

## 2024-10-22 ASSESSMENT — PAIN DESCRIPTION - ORIENTATION
ORIENTATION: ANTERIOR

## 2024-10-22 ASSESSMENT — PAIN DESCRIPTION - FREQUENCY
FREQUENCY: CONTINUOUS
FREQUENCY: CONTINUOUS

## 2024-10-22 ASSESSMENT — PAIN - FUNCTIONAL ASSESSMENT: PAIN_FUNCTIONAL_ASSESSMENT: 0-10

## 2024-10-22 ASSESSMENT — PAIN DESCRIPTION - PAIN TYPE
TYPE: SURGICAL PAIN
TYPE: SURGICAL PAIN

## 2024-10-22 ASSESSMENT — PAIN SCALES - GENERAL
PAINLEVEL_OUTOF10: 7
PAINLEVEL_OUTOF10: 7
PAINLEVEL_OUTOF10: 4

## 2024-10-22 ASSESSMENT — PAIN DESCRIPTION - LOCATION
LOCATION: ABDOMEN

## 2024-10-22 ASSESSMENT — PAIN DESCRIPTION - ONSET
ONSET: ON-GOING
ONSET: AWAKENED FROM SLEEP

## 2024-10-22 ASSESSMENT — PAIN DESCRIPTION - DESCRIPTORS
DESCRIPTORS: ACHING
DESCRIPTORS: PATIENT UNABLE TO DESCRIBE

## 2024-10-22 NOTE — PERIOP NOTE
Patient only able to void 100 mL at this time.  mL per bladder scanner. Alt MD aware of this. Patient educated to report to ED if no urine output in the next 8 hours.

## 2024-10-22 NOTE — ANESTHESIA PRE PROCEDURE
D deficiency E55.9   • Thyroid nodule E04.1   • Incomplete tear of left rotator cuff M75.112   • Bicipital tendinitis of left shoulder M75.22   • Degenerative joint disease of left acromioclavicular joint M19.012   • Superior glenoid labrum lesion of left shoulder S43.432A   • Abnormal uterine bleeding (AUB) N93.9   • Fibroids D21.9   • Adenomyosis N80.03       Past Medical History:        Diagnosis Date   • Anxiety     History of    • Asthma     Has been numerous years since last use of inhalers    • Atrial fibrillation (HCC)     followed by [facility]   • Autoimmune disorder (HCC) 2019    Hashimotos   • Benign mole 01/2019    Pre-Cancerous x 2 on Abdomen   • Breast disorder    • Cancer (HCC) 2-2020    Pre cancerous on stomach   • Family history of breast cancer 06/12/2018    Pt saw [facility].  Had genetic testing and this was pending.  Her lifetime risk based on family hx was 20% for breast cancer.  Candidate for annual breast mammogram and MRI.     • GDM (gestational diabetes mellitus)     with 1st pregnancy   • Gestational diabetes mellitus 12-9-2013 2-   • Hashimoto's disease    • Heart defect 9-2023    Heart ablation done 2-2024   • Hepatitis 04/2024    Recent liver bx which showed resolving hepatitis likely 2/2 DILI   • Hypertension     History of, no meds    • Liver disease 4-2024    Liver enzymes thru the roof, back to normal currently, liver biopsy done which showed resolving hepatitis, followed by GI   • Menopausal symptoms     Headaches, abdominal pains   • Miscarriage 2015   • Transaminitis    • Vitamin D deficiency        Past Surgical History:        Procedure Laterality Date   • CARDIAC ELECTROPHYSIOLOGY MAPPING AND ABLATION  02/2024   • CHOLECYSTECTOMY, LAPAROSCOPIC  12/9/2009   • COLONOSCOPY     • LIVER BIOPSY  2024    Recent liver bx which showed resolving hepatitis likely 2/2 DILI   • ORTHOPEDIC SURGERY      Right foot and toes surgery   • ROTATOR CUFF REPAIR  2022

## 2024-10-22 NOTE — ANESTHESIA POSTPROCEDURE EVALUATION
Department of Anesthesiology  Postprocedure Note    Patient: Heydi Medina  MRN: [id number]  YOB: 1975  Date of evaluation: 10/22/2024    Procedure Summary       Date: 10/22/24 Room / Location: [facility] [place] MAIN OR  / [facility] MAIN OR    Anesthesia Start: 0922 Anesthesia Stop: 1131    Procedure: ERAS/ HYSTERECTOMY ABDOMINAL LAPAROSCOPIC/ BILATERAL SALPINGECTOMY/ CYSTOSCOPY (Abdomen) Diagnosis:       Abnormal uterine bleeding (AUB)      Fibroids      Adenomyosis      (Abnormal uterine bleeding (AUB) [N93.9])      (Fibroids [D21.9])      (Adenomyosis [N80.03])    Surgeons: Lynn Fernández DO Responsible Provider: Alexandro Jacobo MD    Anesthesia Type: general ASA Status: 2            Anesthesia Type: No value filed.    Jocelyne Phase I: Jocelyne Score: 10    Jocelyne Phase II:      Anesthesia Post Evaluation    Patient location during evaluation: PACU  Patient participation: complete - patient participated  Level of consciousness: awake and alert  Airway patency: patent  Nausea & Vomiting: no nausea and no vomiting  Cardiovascular status: hemodynamically stable  Respiratory status: acceptable, nonlabored ventilation and spontaneous ventilation  Hydration status: euvolemic  Comments: /77   Pulse 63   Temp 97.9 °F (36.6 °C) (Temporal)   Resp 13   Ht 1.626 m (5' 4\")   Wt 81.6 kg (180 lb)   LMP 09/05/2024   SpO2 97%   BMI 30.90 kg/m²     Multimodal analgesia pain management approach  Pain management: adequate and satisfactory to patient    No notable events documented.

## 2024-10-22 NOTE — OP NOTE
findings as noted above.    Next attention was turned to left fallopian tube which was elevated and the tube was removed using the harmonic scalpel.  Next, the round ligament and  uteroovarian were taken down with the harmonic scalpel.  The harmonic was used to take down the cardinals and the uterine artery pedicle with excellent hemostasis.  Next bladder flap was dissected. Next the right adnexa was taken down in similar fashion to left.  At this time, the harmonic scalpel was used to dissect the vaginal cuff at the apex of the v-care cup.  Next the uterus was pulled into the vaginal vault and the cuff was sutured using 0-vicryl in figure of 8 fashion and interrupted on the angles and stratafix was used to close the central defect with excellent hemostasis.  Pt did have some bleeding lateral to the cuff on the left that was made hemostatic with heated rick scissor.  Attention was then turned to the bladder for cystoscopy with findings as above.    Next the pelvic was irrigated and excellent hemostasis was insured after pressure was reduced.  .  All ports were removed under direct visualization.  The skin site over the right lower quadrant site was sutured with 4-0 monocryl.  Dermabond was placed over all sites. Pt tolerated the procedure well and was taken to the PACU in stable fashion.  Kaur catheter was left in place.   Dr reyes helped with camera operation, retraction, performing his side of the case.      Signed By: Lynn Fernández DO     October 22, 2024

## 2024-10-24 ENCOUNTER — TELEPHONE (OUTPATIENT)
Age: 49
End: 2024-10-24

## 2024-10-24 NOTE — TELEPHONE ENCOUNTER
Called and spoke to pt who agreed to reschedule her appt from 10/28 to 11/27 at 8:30am. Told pt to be 30 mins early. Appt rescheduled.

## 2024-10-31 NOTE — PROGRESS NOTES
Patient presents today for a post-op visit.  She is 2 weeks from ERAS/ HYSTERECTOMY ABDOMINAL LAPAROSCOPIC/ BILATERAL SALPINGECTOMY/ CYSTOSCOPY on 10/22/2024.  She is tolerating a regular diet, passing gas and her pain is under good control.  She denies signs of bleeding and infection.      Pt reports she is still using stool softeners with laxatives. Pt reports pain with sitting up. Pt reports bleeding only when straining the incision. Pt reports nausea, dizziness, light-headedness.     Shared surgical pictures with pt and discussed results of pathology.      FINAL PATHOLOGIC DIAGNOSIS          Uterus, bilateral tubes:   -          Cervix  benign endocervical polyp.   -     Endometrium  proliferative phase.   -     Myometrium  leiomyoma.   -     Fallopian tubes  benign paratubal cysts.       /82   Ht 1.626 m (5' 4\")   Wt 79.1 kg (174 lb 6.4 oz)   LMP 09/05/2024 (Exact Date)   BMI 29.94 kg/m²     General appearance: no distress  Respiratory: clear to auscultation bilaterally  Cardiovascular: regular heart rate, no murmurs  Abdomen: soft, non-tender, bowel sounds present.  Port sites clean /dry and intact.    Extremities: no calf pain or tenderness. No redness.     Neurologic: alert and oriented, cranial nerves grossly intact    Plan: Patient is really doing well.  Eating.  Not sure what nausea is about but good bowel and bladder function.  Some vaginal spotting.  Given precautions.  Return 4 weeks recheck.  release her to go to work on next monday.  not this one but next.

## 2024-11-07 ENCOUNTER — OFFICE VISIT (OUTPATIENT)
Dept: OBGYN CLINIC | Age: 49
End: 2024-11-07

## 2024-11-07 VITALS
DIASTOLIC BLOOD PRESSURE: 82 MMHG | HEIGHT: 64 IN | WEIGHT: 174.4 LBS | SYSTOLIC BLOOD PRESSURE: 120 MMHG | BODY MASS INDEX: 29.78 KG/M2

## 2024-11-07 DIAGNOSIS — Z09 POSTOP CHECK: Primary | ICD-10-CM

## 2024-11-07 PROCEDURE — 99024 POSTOP FOLLOW-UP VISIT: CPT | Performed by: OBSTETRICS & GYNECOLOGY

## 2024-11-25 ENCOUNTER — OFFICE VISIT (OUTPATIENT)
Age: 49
End: 2024-11-25
Payer: COMMERCIAL

## 2024-11-25 DIAGNOSIS — M47.812 CERVICAL SPONDYLOSIS: ICD-10-CM

## 2024-11-25 DIAGNOSIS — S43.402A SPRAIN OF LEFT SHOULDER, UNSPECIFIED SHOULDER SPRAIN TYPE, INITIAL ENCOUNTER: Primary | ICD-10-CM

## 2024-11-25 DIAGNOSIS — S53.402A SPRAIN OF LEFT ELBOW, INITIAL ENCOUNTER: ICD-10-CM

## 2024-11-25 PROCEDURE — 99214 OFFICE O/P EST MOD 30 MIN: CPT | Performed by: ORTHOPAEDIC SURGERY

## 2024-11-25 RX ORDER — GABAPENTIN 100 MG/1
100 CAPSULE ORAL 3 TIMES DAILY
Qty: 90 CAPSULE | Refills: 0 | Status: SHIPPED | OUTPATIENT
Start: 2024-11-25 | End: 2024-12-25

## 2024-11-25 RX ORDER — METHYLPREDNISOLONE 4 MG/1
TABLET ORAL
Qty: 1 KIT | Refills: 0 | Status: SHIPPED | OUTPATIENT
Start: 2024-11-25
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
## 2024-12-04 ENCOUNTER — HOSPITAL ENCOUNTER (OUTPATIENT)
Dept: PHYSICAL THERAPY | Age: 49
Setting detail: RECURRING SERIES
Discharge: HOME | End: 2024-12-07
Attending: ORTHOPAEDIC SURGERY
Payer: COMMERCIAL

## 2024-12-04 DIAGNOSIS — G56.12 MEDIAN NERVE DYSFUNCTION, LEFT: Primary | ICD-10-CM

## 2024-12-04 DIAGNOSIS — M54.2 NECK PAIN: ICD-10-CM

## 2024-12-04 DIAGNOSIS — M25.522 PAIN IN LEFT ELBOW: ICD-10-CM

## 2024-12-04 DIAGNOSIS — M25.512 ACUTE PAIN OF LEFT SHOULDER: ICD-10-CM

## 2024-12-04 PROCEDURE — 97140 MANUAL THERAPY 1/> REGIONS: CPT

## 2024-12-04 PROCEDURE — 97162 PT EVAL MOD COMPLEX 30 MIN: CPT
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
## 2024-12-05 NOTE — PROGRESS NOTES
1st rib MET. L median nerve glides mobilizing at the elbow.    FUNCTIONAL DRY NEEDLING: (2 minutes untimed):      With written consent received and precautions reviewed, instrument-assisted soft tissue mobilization was performed to:  Muscle(s): left upper trapezius  Needle(s) size used: .30 x 50mm  Technique(s): use of pistoning techniques  For the purpose of: trigger point release  and increase blood flow    The patient tolerated the treatment with a positive treatment effect and no complications noted. Clinical outcome of the treatment included needling treatment outcome: improved muscle tone and play.    Dry Needles Used: 1   Dry Needles Removed: 1     Patient has been educated with post-treatment care including hydration and mobility.     HEP: instructed in self median nerve glides on left UE    Treatment/Session Summary:    Treatment Assessment:   Good response to dry needling and improved median nerve glides in LE after manual treatment.   Communication/Consultation:  Therapy Evaluation sent to referring provider  Equipment provided today:  HEP  Recommendations/Intent for next treatment session: Next visit will focus on nerve glides, 1st rib mobilization.    >Total Treatment Billable Duration:  10 minutes + Eval  Time In: 0930  Time Out: 1015    Monica Gannon PT         Charge Capture  Events  [facility] Portal  Appt Desk  Attendance Report     Future Appointments   Date Time Provider Department Center   12/9/2024 11:00 AM Lynn Fernández DO upobgyn [facility]   12/10/2024  7:30 AM Monica Gannon, PT [facility]   12/12/2024  7:30 AM Monica Gannon, PT [facility]   12/17/2024  7:30 AM Monica Gannon, PT [facility]   12/19/2024  7:30 AM Monica Gannon, PT [facility]   12/23/2024  3:15 PM Eric Shaikh, PT [facility]   12/26/2024  8:30 AM Katheryn Toney, PTA [facility]   1/14/2025  7:30 AM Jj Gonsales Jr., MD [facility]   9/8/2025 10:00 AM Konrad Prescott MD END [facility]

## 2024-12-06 NOTE — PROGRESS NOTES
Heydi  is a 48 y.o. female, .  Patient's last menstrual period was 2024 (exact date)., who is being seen for 6 week follow up following ERAS/ HYSTERECTOMY ABDOMINAL LAPAROSCOPIC/ BILATERAL SALPINGECTOMY/ CYSTOSCOPY on 10/22/2024.  Pt would like to be released for work 2024. Pt states pain while sitting up has resolved, bleeding when straining, nausea, dizziness, lightheadedness have all resolved.     Surgical Pathology:    Uterus, bilateral tubes:   -          Cervix  benign endocervical polyp.   -     Endometrium  proliferative phase.   -     Myometrium  leiomyoma.   -     Fallopian tubes  benign paratubal cysts.     HISTORY:    OB History          3    Para   2    Term   2            AB   1    Living   2         SAB   1    IAB        Ectopic        Molar        Multiple        Live Births   2              GYN History         Sexual History:   Social History     Substance and Sexual Activity   Sexual Activity Yes    Partners: Male    Birth control/protection: Surgical    Comment: YOVANI          has a past medical history of Anxiety, Asthma, Atrial fibrillation (HCC), Autoimmune disorder (HCC), Benign mole, Breast disorder, Cancer (HCC), Family history of breast cancer, GDM (gestational diabetes mellitus), Gestational diabetes mellitus, Hashimoto's disease, Heart defect, Hepatitis, Hypertension, Liver disease, Menopausal symptoms, Miscarriage, Transaminitis, and Vitamin D deficiency. .    Past Surgical History:   Procedure Laterality Date    CARDIAC ELECTROPHYSIOLOGY MAPPING AND ABLATION  2024    CHOLECYSTECTOMY, LAPAROSCOPIC  2009    COLONOSCOPY      HYSTERECTOMY (CERVIX STATUS UNKNOWN) N/A 10/22/2024    ERAS/ HYSTERECTOMY ABDOMINAL LAPAROSCOPIC/ BILATERAL SALPINGECTOMY/ CYSTOSCOPY performed by Lynn Fern[facility]dez DO [facility]    LIVER BIOPSY      Recent liver bx which showed resolving hepatitis likely 2/2 DILI    ORTHOPEDIC SURGERY      Right foot and toes surgery

## 2024-12-09 ENCOUNTER — OFFICE VISIT (OUTPATIENT)
Dept: OBGYN CLINIC | Age: 49
End: 2024-12-09

## 2024-12-09 VITALS
WEIGHT: 176.4 LBS | HEIGHT: 64 IN | DIASTOLIC BLOOD PRESSURE: 86 MMHG | BODY MASS INDEX: 30.11 KG/M2 | SYSTOLIC BLOOD PRESSURE: 130 MMHG

## 2024-12-09 DIAGNOSIS — Z09 POSTOP CHECK: Primary | ICD-10-CM

## 2024-12-09 PROCEDURE — 99024 POSTOP FOLLOW-UP VISIT: CPT | Performed by: OBSTETRICS & GYNECOLOGY

## 2024-12-10 ENCOUNTER — HOSPITAL ENCOUNTER (OUTPATIENT)
Dept: PHYSICAL THERAPY | Age: 49
Setting detail: RECURRING SERIES
Discharge: HOME | End: 2024-12-13
Attending: ORTHOPAEDIC SURGERY
Payer: COMMERCIAL

## 2024-12-10 PROCEDURE — 97140 MANUAL THERAPY 1/> REGIONS: CPT

## 2024-12-10 NOTE — PROGRESS NOTES
Heydi Medina  : 1975  Primary: [employer] [facility] [employer]  Secondary:  [facility] [address] [place] [address]  Phone: [phone]  Fax: [phone] Plan Frequency: 1-2x/week for 60 days    Plan of Care/Certification Expiration Date: 25        Plan of Care/Certification Expiration Date:  Plan of Care/Certification Expiration Date: 25    Frequency/Duration:   Plan Frequency: 1-2x/week for 60 days      Time In/Out:   Time In: 0732  Time Out: 0815      PT Visit Info:    Total # of Visits to Date: 2      Visit Count:  2    OUTPATIENT PHYSICAL THERAPY:   Treatment Note 12/10/2024       Episode  (L shoulder/elbow pain)               Treatment Diagnosis:    Median nerve dysfunction, left  Neck pain  Acute pain of left shoulder  Pain in left elbow  Medical/Referring Diagnosis:    Sprain of left shoulder, unspecified shoulder sprain type, initial encounter  Sprain of left elbow, initial encounter  Cervical spondylosis    L shoulder and neck  Referring Physician:  Jj Gonsales Jr., MD MD Orders:  eval & treat, home exercise program, strengthening, range of motion, traction, dry needling, deep massage, and all modalities   Return MD Appt:  25  Date of Onset:  Onset Date: 24     Allergies:   Patient has no known allergies.  Restrictions/Precautions:   None      Interventions Planned (Treatment may consist of any combination of the following):  Current Treatment Recommendations: Strengthening; ROM; Dry needling; Home exercise program; Manual    Subjective Comments: Pt reports she did have some relief for a few days after therapy and then the pain returned.     Initial Pain Level::    5  /10  Post Session Pain Level:      4  /10  Medications Last Reviewed:  12/10/2024  Updated Objective Findings:  none  Treatment   MANUAL THERAPY: (38 minutes): for decreased pain and improved spinal mobility. Pt prone and central PA's to lower

## 2024-12-12 ENCOUNTER — HOSPITAL ENCOUNTER (OUTPATIENT)
Dept: PHYSICAL THERAPY | Age: 49
Setting detail: RECURRING SERIES
Discharge: HOME | End: 2024-12-15
Attending: ORTHOPAEDIC SURGERY
Payer: COMMERCIAL

## 2024-12-12 PROCEDURE — 97140 MANUAL THERAPY 1/> REGIONS: CPT

## 2024-12-12 NOTE — PROGRESS NOTES
PA's to lower cervical and upper thoracic spine grade 2-3. 1st rib mobilizations with manual cervical traction.  L median nerve glides mobilizing at the elbow and with LTR and radial nerve glides mobilizing at the shoulder. AP mobilization to mid thoracic spine grade 4.     FUNCTIONAL DRY NEEDLING: (2 minutes untimed):      With written consent received and precautions reviewed, instrument-assisted soft tissue mobilization was performed to:  Muscle(s): right upper trapezius  Needle(s) size used: .30 x 30mm  Technique(s): use of pistoning techniques  For the purpose of: trigger point release  and increase blood flow    The patient tolerated the treatment with a positive treatment effect and no complications noted. Clinical outcome of the treatment included needling treatment outcome: improved muscle tone and play.    Dry Needles Used: 1   Dry Needles Removed: 1     Patient has been educated with post-treatment care including hydration and mobility.     HEP: instructed in self median nerve glides on left UE    Treatment/Session Summary:    Treatment Assessment: Less stiffness to 1st rib today. Improved upper trap mobility after dry needling.     Communication/Consultation: none  Equipment provided today:  HEP  Recommendations/Intent for next treatment session: Next visit will focus on nerve glides, 1st rib mobilization.    >Total Treatment Billable Duration:  38 minutes  Time In: 0733  Time Out: 0817    Monica Gannon PT         Charge Capture  Events  [employer] Portal  Appt Desk  Attendance Report     Future Appointments   Date Time Provider Department Center   12/17/2024  7:30 AM Monica Gannon, PT [facility]   12/19/2024  7:30 AM Monica Gannon, PT [facility]   12/23/2024  3:15 PM Eric Shaikh, PT [facility]   12/26/2024  8:30 AM Katheryn Toney, PTA [facility]   1/14/2025  7:30 AM Jj Gonsales Jr., MD [facility]   9/8/2025 10:00 AM Konrad Prescott MD [facility]

## 2024-12-17 ENCOUNTER — HOSPITAL ENCOUNTER (OUTPATIENT)
Dept: PHYSICAL THERAPY | Age: 49
Setting detail: RECURRING SERIES
Discharge: HOME | End: 2024-12-20
Attending: ORTHOPAEDIC SURGERY
Payer: COMMERCIAL

## 2024-12-17 PROCEDURE — 97140 MANUAL THERAPY 1/> REGIONS: CPT

## 2024-12-17 NOTE — PROGRESS NOTES
Heydi Medina  : 1975  Primary: [employer] [place] [employer] ([place] [facility])  Secondary:  [facility] [address] [place] [address]  Phone: [phone]  Fax: [phone] Plan Frequency: 1-2x/week for 60 days    Plan of Care/Certification Expiration Date: 25        Plan of Care/Certification Expiration Date:  Plan of Care/Certification Expiration Date: 25    Frequency/Duration:   Plan Frequency: 1-2x/week for 60 days      Time In/Out:   Time In: 32  Time Out: 08      PT Visit Info:    Total # of Visits to Date: 4      Visit Count:  4    OUTPATIENT PHYSICAL THERAPY:   Treatment Note 2024       Episode  (L shoulder/elbow pain)               Treatment Diagnosis:    Median nerve dysfunction, left  Neck pain  Acute pain of left shoulder  Pain in left elbow  Medical/Referring Diagnosis:    Sprain of left shoulder, unspecified shoulder sprain type, initial encounter  Sprain of left elbow, initial encounter  Cervical spondylosis    L shoulder and neck  Referring Physician:  Jj Gonsales Jr., MD MD Orders:  eval & treat, home exercise program, strengthening, range of motion, traction, dry needling, deep massage, and all modalities   Return MD Appt:  25  Date of Onset:  Onset Date: 24     Allergies:   Patient has no known allergies.  Restrictions/Precautions:   None      Interventions Planned (Treatment may consist of any combination of the following):  Current Treatment Recommendations: Strengthening; ROM; Dry needling; Home exercise program; Manual    Subjective Comments: Pt reports she was feeling better last week but did something this morning.     Initial Pain Level::    5  /10  Post Session Pain Level:      4  /10  Medications Last Reviewed:  2024  Updated Objective Findings:  none  Treatment   MANUAL THERAPY: (30 minutes): for decreased pain and improved spinal mobility. Pt prone and central PA's to lower cervical and upper

## 2024-12-19 ENCOUNTER — HOSPITAL ENCOUNTER (OUTPATIENT)
Dept: PHYSICAL THERAPY | Age: 49
Setting detail: RECURRING SERIES
Discharge: HOME | End: 2024-12-22
Attending: ORTHOPAEDIC SURGERY
Payer: COMMERCIAL

## 2024-12-19 PROCEDURE — 97140 MANUAL THERAPY 1/> REGIONS: CPT

## 2024-12-19 NOTE — PROGRESS NOTES
Heydi Medina  : 1975  Primary: [employer] [place] [employer] ([place] [facility])  Secondary:  [facility] [address] [place] [address]  Phone: [phone]  Fax: [phone] Plan Frequency: 1-2x/week for 60 days    Plan of Care/Certification Expiration Date: 25        Plan of Care/Certification Expiration Date:  Plan of Care/Certification Expiration Date: 25    Frequency/Duration:   Plan Frequency: 1-2x/week for 60 days      Time In/Out:   Time In: 730  Time Out: 805      PT Visit Info:    Total # of Visits to Date: 5      Visit Count:  5    OUTPATIENT PHYSICAL THERAPY:   Treatment Note 2024       Episode  (L shoulder/elbow pain)               Treatment Diagnosis:    Median nerve dysfunction, left  Neck pain  Acute pain of left shoulder  Pain in left elbow  Medical/Referring Diagnosis:    Sprain of left shoulder, unspecified shoulder sprain type, initial encounter  Sprain of left elbow, initial encounter  Cervical spondylosis    L shoulder and neck  Referring Physician:  Jj Gonsales Jr., MD MD Orders:  eval & treat, home exercise program, strengthening, range of motion, traction, dry needling, deep massage, and all modalities   Return MD Appt:  25  Date of Onset:  Onset Date: 24     Allergies:   Patient has no known allergies.  Restrictions/Precautions:   None      Interventions Planned (Treatment may consist of any combination of the following):  Current Treatment Recommendations: Strengthening; ROM; Dry needling; Home exercise program; Manual    Subjective Comments: Pt reports she went to chiropractor yesterday and they had difficulty adjusting the left side of his neck.     Initial Pain Level::    5  /10  Post Session Pain Level:      4  /10  Medications Last Reviewed:  2024  Updated Objective Findings:  none  Treatment   MANUAL THERAPY: (28 minutes): for decreased pain and improved spinal mobility. Pt prone and central

## 2024-12-23 ENCOUNTER — HOSPITAL ENCOUNTER (OUTPATIENT)
Dept: PHYSICAL THERAPY | Age: 49
Setting detail: RECURRING SERIES
Discharge: HOME | End: 2024-12-26
Attending: ORTHOPAEDIC SURGERY
Payer: COMMERCIAL

## 2024-12-23 PROCEDURE — 97110 THERAPEUTIC EXERCISES: CPT

## 2024-12-23 PROCEDURE — 97140 MANUAL THERAPY 1/> REGIONS: CPT

## 2024-12-23 ASSESSMENT — PAIN SCALES - GENERAL: PAINLEVEL_OUTOF10: 3

## 2024-12-23 ASSESSMENT — PAIN DESCRIPTION - LOCATION: LOCATION: SHOULDER;NECK

## 2024-12-23 NOTE — PROGRESS NOTES
Heydi Medina  : 1975  [place]: [employer] [place] [employer] ([place] [facility])  Secondary:  [facility] [address] [place] [address]  Phone: [phone]  Fax: [phone] Plan Frequency: 1-2x/week for 60 days    Plan of Care/Certification Expiration Date: 25        Plan of Care/Certification Expiration Date:  Plan of Care/Certification Expiration Date: 25    Frequency/Duration:   Plan Frequency: 1-2x/week for 60 days      Time In/Out:   Time In: 1516  Time Out: 1558      PT Visit Info:    Total # of Visits to Date: 6  Progress Note Counter: 6      Visit Count: 6    OUTPATIENT PHYSICAL THERAPY:   Treatment Note 2024       Episode  (L shoulder/elbow pain)               Treatment Diagnosis:    Median nerve dysfunction, left  Neck pain  Acute pain of left shoulder  Pain in left elbow  Medical/Referring Diagnosis:    Sprain of left shoulder, unspecified shoulder sprain type, initial encounter  Sprain of left elbow, initial encounter  Cervical spondylosis  L shoulder and neck  Referring Physician: Jj Gonsales Jr., MD MD Orders:  eval & treat, home exercise program, strengthening, range of motion, traction, dry needling, deep massage, and all modalities   Return MD Appt: 25  Date of Onset: Onset Date: 24     Allergies: Patient has no known allergies.  Restrictions/Precautions:   None      Interventions Planned (Treatment may consist of any combination of the following): Current Treatment Recommendations: Strengthening; ROM; Dry needling; Home exercise program; Manual    Subjective Comments: Pt reports she's feeling tight in her shoulder and neck today. She says she has good days and bad days. She says she went to the chiropractor a few days ago.     Initial Pain Level:   Shoulder, Neck 3/10  Post Session Pain Level:   Shoulder, Neck 3/10    Medications Last Reviewed: 2024  Updated Objective Findings: None    Treatment   MANUAL

## 2024-12-26 ENCOUNTER — HOSPITAL ENCOUNTER (OUTPATIENT)
Dept: PHYSICAL THERAPY | Age: 49
Setting detail: RECURRING SERIES
Discharge: HOME | End: 2024-12-29
Attending: ORTHOPAEDIC SURGERY
Payer: COMMERCIAL

## 2024-12-26 PROCEDURE — 97140 MANUAL THERAPY 1/> REGIONS: CPT

## 2024-12-26 PROCEDURE — 97110 THERAPEUTIC EXERCISES: CPT

## 2024-12-26 NOTE — PROGRESS NOTES
Heydi Medina  : 1975  [place]: [employer] [place] [employer] [place] [facility])  Secondary:  [facility] [address] [place] [address]  Phone: [phone]  Fax: [phone] Plan Frequency: 1-2x/week for 60 days    Plan of Care/Certification Expiration Date: 25        Plan of Care/Certification Expiration Date:  Plan of Care/Certification Expiration Date: 25    Frequency/Duration:   Plan Frequency: 1-2x/week for 60 days      Time In/Out:   Time In: 830  Time Out: 915      PT Visit Info:    Total # of Visits to Date: 7  Progress Note Counter: 7      Visit Count: 7    OUTPATIENT PHYSICAL THERAPY:   Treatment Note 2024       Episode  (L shoulder/elbow pain)               Treatment Diagnosis:    Median nerve dysfunction, left  Neck pain  Acute pain of left shoulder  Pain in left elbow  Medical/Referring Diagnosis:    Sprain of left shoulder, unspecified shoulder sprain type, initial encounter  Sprain of left elbow, initial encounter  Cervical spondylosis  L shoulder and neck  Referring Physician: Jj Gonsales Jr., MD MD Orders:  eval & treat, home exercise program, strengthening, range of motion, traction, dry needling, deep massage, and all modalities   Return MD Appt: 25  Date of Onset: Onset Date: 24     Allergies: Patient has no known allergies.  Restrictions/Precautions:   None      Interventions Planned (Treatment may consist of any combination of the following): Current Treatment Recommendations: Strengthening; ROM; Dry needling; Home exercise program; Manual    Subjective Comments: Pt reports the elbow pain is nearly gone and she noticed this morning that she could reach to grab her cup and had no pain.  She reports she is a little tight today in the upper trap region.      Initial Pain Level:      3/10 \"upper trap and shoulder blade\"  Post Session Pain Level:      \"looser, better\"/10    Medications Last Reviewed:

## 2024-12-31 ENCOUNTER — HOSPITAL ENCOUNTER (OUTPATIENT)
Dept: PHYSICAL THERAPY | Age: 49
Setting detail: RECURRING SERIES
Discharge: HOME | End: 2025-01-03
Attending: ORTHOPAEDIC SURGERY
Payer: COMMERCIAL

## 2024-12-31 PROCEDURE — 97140 MANUAL THERAPY 1/> REGIONS: CPT

## 2024-12-31 NOTE — PROGRESS NOTES
has really helped keep her overall tension and pain under control.  Patient is unsure when she will be able to come back to therapy (insurance cost re-set) so she would like to focus on the manual treatments today.      Initial Pain Level:      3/10 \"upper trap and shoulder blade\"  Post Session Pain Level:      \"looser, much better\"  2/10    Medications Last Reviewed: 12/31/2024  Updated Objective Findings: None    Treatment   MANUAL THERAPY: (40 minutes): STM to periscapular musculature in seated and sidelying with trigger point release to upper trap/levator scap, pectoral region and teres major in supine and sidelying, and scapular mobilizations in all planes    HEP:  self median nerve glides on left UE and 1st rib mobilizations.       Treatment/Session Summary:    Treatment Assessment: Patient responded well with trigger point release and mobilizing scapula into improved scapular depression today.  Patient to call to schedule next appointment when she is able to ascertain her insurance cost responsibility  Communication/Consultation: None  Equipment provided today: HEP  Recommendations/Intent for next treatment session: Next visit will focus on nerve glides, 1st rib mobilization.    >Total Treatment Billable Duration: 40 minutes  Time In: 0915  Time Out: 1000    Katheryn Toney PTA       Charge Capture  Events  [employer] Portal  Appt Desk  Attendance Report     Future Appointments   Date Time Provider Department Center   1/14/2025  7:30 AM Jj Gonsales Jr., MD [facility]   9/8/2025 10:00 AM Konrad Prescott MD [facility]

## 2025-01-14 ENCOUNTER — OFFICE VISIT (OUTPATIENT)
Dept: ORTHOPEDIC SURGERY | Age: 50
End: 2025-01-14
Payer: COMMERCIAL

## 2025-01-14 DIAGNOSIS — S53.402A SPRAIN OF LEFT ELBOW, INITIAL ENCOUNTER: ICD-10-CM

## 2025-01-14 DIAGNOSIS — S43.402A SPRAIN OF LEFT SHOULDER, UNSPECIFIED SHOULDER SPRAIN TYPE, INITIAL ENCOUNTER: Primary | ICD-10-CM

## 2025-01-14 DIAGNOSIS — M47.812 CERVICAL SPONDYLOSIS: ICD-10-CM

## 2025-01-14 PROCEDURE — 99212 OFFICE O/P EST SF 10 MIN: CPT | Performed by: ORTHOPAEDIC SURGERY
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
## 2025-06-24 SDOH — ECONOMIC STABILITY: FOOD INSECURITY: WITHIN THE PAST 12 MONTHS, THE FOOD YOU BOUGHT JUST DIDN'T LAST AND YOU DIDN'T HAVE MONEY TO GET MORE.: NEVER TRUE

## 2025-06-24 SDOH — ECONOMIC STABILITY: TRANSPORTATION INSECURITY
IN THE PAST 12 MONTHS, HAS THE LACK OF TRANSPORTATION KEPT YOU FROM MEDICAL APPOINTMENTS OR FROM GETTING MEDICATIONS?: NO

## 2025-06-24 SDOH — ECONOMIC STABILITY: FOOD INSECURITY: WITHIN THE PAST 12 MONTHS, YOU WORRIED THAT YOUR FOOD WOULD RUN OUT BEFORE YOU GOT MONEY TO BUY MORE.: NEVER TRUE

## 2025-06-24 SDOH — ECONOMIC STABILITY: INCOME INSECURITY: IN THE LAST 12 MONTHS, WAS THERE A TIME WHEN YOU WERE NOT ABLE TO PAY THE MORTGAGE OR RENT ON TIME?: NO

## 2025-06-26 NOTE — PROGRESS NOTES
HPI:  Ms. Medina is a 49 y.o. female  who is here today for a well woman exam. She complains of pain in axillary areas (bilateral).  She is going to be referred to dr moulton breast surgeon for her elevated risk of breast cancer.  She is having mri soon.  She is doing  a new job.  She is having some perimenopausal symptoms.  Pt knows she should consider prophylactic mastectomy.         Date Performed Result   PAP 2024    Hysterectomy NILM; HPV Negative   Mammogram 2024 (Screening)                        2024 (Diagnostic) TC Score: 44.39%  1. Right breast asymmetry, further characterization recommended  2. No changes to suggest malignancy on the left.  3. Strong family history of breast cancer, elevated lifetime risk of breast  cancer based on preliminary Tyrer-Cuzick calculation.    No evidence of a significant new finding in the right breast. Stable  benign-appearing asymmetry is present posteriorly. No mammographic evidence of  right breast malignancy.   Colonoscopy 2024 WNL    Dexa NA        OB History          3    Para   2    Term   2            AB   1    Living   2         SAB   1    IAB        Ectopic        Molar        Multiple        Live Births   2              GYN History     Patient's last menstrual period was 2024 (exact date). negative postcoital bleeding        Past Medical History:   Diagnosis Date    Anxiety     History of     Asthma     Has been numerous years since last use of inhalers     Atrial fibrillation (HCC)     followed by [facility]    Autoimmune disorder     Hashimotos    Benign mole 2019    Pre-Cancerous x 2 on Abdomen    Breast cyst     Breast disorder     Cancer (HCC) -    Pre cancerous on stomach    Complication of anesthesia     Diffuse cystic mastopathy 2024    Family history of breast cancer 2018    Pt saw [email or web address].  Had genetic testing and this was pending.  Her lifetime risk based

## 2025-06-27 ENCOUNTER — OFFICE VISIT (OUTPATIENT)
Dept: OBGYN CLINIC | Age: 50
End: 2025-06-27
Payer: COMMERCIAL

## 2025-06-27 VITALS
BODY MASS INDEX: 30.19 KG/M2 | WEIGHT: 176.8 LBS | DIASTOLIC BLOOD PRESSURE: 86 MMHG | SYSTOLIC BLOOD PRESSURE: 138 MMHG | HEIGHT: 64 IN

## 2025-06-27 DIAGNOSIS — Z13.89 SCREENING FOR GENITOURINARY CONDITION: ICD-10-CM

## 2025-06-27 DIAGNOSIS — Z12.11 SCREENING FOR COLON CANCER: ICD-10-CM

## 2025-06-27 DIAGNOSIS — Z01.419 WELL WOMAN EXAM: Primary | ICD-10-CM

## 2025-06-27 DIAGNOSIS — Z91.89 AT HIGH RISK FOR BREAST CANCER: ICD-10-CM

## 2025-06-27 DIAGNOSIS — Z80.3 FAMILY HISTORY OF BREAST CANCER: ICD-10-CM

## 2025-06-27 DIAGNOSIS — Z12.31 SCREENING MAMMOGRAM FOR BREAST CANCER: ICD-10-CM

## 2025-06-27 LAB
BILIRUBIN, URINE, POC: NEGATIVE
BLOOD URINE, POC: NEGATIVE
GLUCOSE URINE, POC: NEGATIVE
KETONES, URINE, POC: NEGATIVE
LEUKOCYTE ESTERASE, URINE, POC: NEGATIVE
NITRITE, URINE, POC: NEGATIVE
PH, URINE, POC: 7 (ref 4.6–8)
PROTEIN,URINE, POC: NEGATIVE
SPECIFIC GRAVITY, URINE, POC: 1.01 (ref 1–1.03)
URINALYSIS CLARITY, POC: CLEAR
URINALYSIS COLOR, POC: YELLOW
UROBILINOGEN, POC: NORMAL MG/DL

## 2025-06-27 PROCEDURE — 99396 PREV VISIT EST AGE 40-64: CPT | Performed by: OBSTETRICS & GYNECOLOGY

## 2025-06-27 PROCEDURE — 81002 URINALYSIS NONAUTO W/O SCOPE: CPT | Performed by: OBSTETRICS & GYNECOLOGY

## 2025-06-27 RX ORDER — VITAMIN B COMPLEX
1 CAPSULE ORAL DAILY
COMMUNITY

## (undated) DEVICE — GLOVE SURG SZ 6 L12IN FNGR THK79MIL GRN LTX FREE

## (undated) DEVICE — SLING ARM SWTH UNIV FOAM 1 SZ FITS MOST

## (undated) DEVICE — PAD,ABDOMINAL,5"X9",ST,LF,25/BX: Brand: MEDLINE INDUSTRIES, INC.

## (undated) DEVICE — LOGICUT SCISSOR LENGTH 320MM: Brand: LOGI - LAPAROSCOPIC INSTRUMENT SYSTEM

## (undated) DEVICE — GAUZE,SPONGE,4"X4",16PLY,STRL,LF,10/TRAY: Brand: MEDLINE

## (undated) DEVICE — INTENDED FOR TISSUE SEPARATION, AND OTHER PROCEDURES THAT REQUIRE A SHARP SURGICAL BLADE TO PUNCTURE OR CUT.: Brand: BARD-PARKER ® STAINLESS STEEL BLADES

## (undated) DEVICE — SUTURE VCRL SZ 0 L27IN ABSRB UD L36MM CP-1 1/2 CIR REV CUT J267H

## (undated) DEVICE — TRI-LUMEN FILTERED TUBE SET WITH ACTIVATED CHARCOAL FILTER: Brand: AIRSEAL

## (undated) DEVICE — SHEARS ENDOSCP HARM 36CM ULTRASONIC CRV TIP UPGRD

## (undated) DEVICE — SUTURE ETHLN SZ 2-0 L18IN NONABSORBABLE BLK L26MM PS 3/8 585H

## (undated) DEVICE — TROCAR: Brand: KII FIOS FIRST ENTRY

## (undated) DEVICE — PAD PT POS 36 IN SURGYPAD DISP

## (undated) DEVICE — SOLUTION IRRIG 1000ML 0.9% SOD CHL USP POUR PLAS BTL

## (undated) DEVICE — SUTURE VICRYL SZ 0 L36IN ABSRB UD CT-1 L36MM 1/2 CIR TAPR PNT VCP946H

## (undated) DEVICE — SLING ARM AD ULT

## (undated) DEVICE — NDL SPNE QNCKE 18GX3.5IN LF --

## (undated) DEVICE — LIQUIBAND RAPID ADHESIVE 36/CS 0.8ML: Brand: MEDLINE

## (undated) DEVICE — GYN LAPAROSCOPY: Brand: MEDLINE INDUSTRIES, INC.

## (undated) DEVICE — PUDDLEVAC FLOOR SUCTION DEVICE: Brand: PUDDLEVAC

## (undated) DEVICE — SOLUTION IRRIG 3000ML 0.9% SOD CHL USP UROMATIC PLAS CONT

## (undated) DEVICE — INSUFFLATION NEEDLE TO ESTABLISH PNEUMOPERITONEUM.: Brand: INSUFFLATION NEEDLE

## (undated) DEVICE — GLOVE SURG SZ 6 THK91MIL LTX FREE SYN POLYISOPRENE ANTI

## (undated) DEVICE — GARMENT,MEDLINE,DVT,INT,CALF,MED, GEN2: Brand: MEDLINE

## (undated) DEVICE — CANISTER, RIGID, 2000CC: Brand: MEDLINE INDUSTRIES, INC.

## (undated) DEVICE — PUMP SUC IRR TBNG L10FT W/ HNDPC ASSEMB STRYKEFLOW 2

## (undated) DEVICE — AIRSEAL 5 MM ACCESS PORT AND LOW PROFILE OBTURATOR WITH BLADELESS OPTICAL TIP, 120 MM LENGTH: Brand: AIRSEAL

## (undated) DEVICE — CARDINAL HEALTH FLEXIBLE LIGHT HANDLE COVER: Brand: CARDINAL HEALTH

## (undated) DEVICE — 90-S, SUCTION PROBE, NON-BENDABLE, MAX CUT LEVEL 11: Brand: SERFAS ENERGY

## (undated) DEVICE — SYRINGE IRRIG 60ML SFT PLIABLE BLB EZ TO GRP 1 HND USE W/

## (undated) DEVICE — VCARE EXTRA LARGE, UTERINE MANIPULATOR, VAGINAL-CERVICAL-AHLUWALIA'S-RETRACTOR-ELEVATOR: Brand: VCARE

## (undated) DEVICE — SHOULDER ARTHRO DR POSTA: Brand: MEDLINE INDUSTRIES, INC.

## (undated) DEVICE — TROCAR: Brand: KII® SLEEVE

## (undated) DEVICE — SUTURE N ABSRB BRAIDED 2-0 MO-6 39 IN 26 MM 1/2 CIR BLU BLK 3910900023

## (undated) DEVICE — SUTURE PROL SZ 2-0 L18IN NONABSORBABLE BLU FS L26MM 3/8 CIR 8685H

## (undated) DEVICE — SUTURE STRATAFIX SPRL PDS + SZ 2-0 L6IN ABSRB VLT L36MM SXPP1B409

## (undated) DEVICE — GOWN,REINFORCED,POLY,AURORA,XXLARGE,STR: Brand: MEDLINE

## (undated) DEVICE — SUTURE VICRYL SZ 4-0 L18IN ABSRB UD L19MM PS-2 3/8 CIR PRIM J496H

## (undated) DEVICE — SYRINGE MED 50ML LUERLOCK TIP